# Patient Record
Sex: FEMALE | Race: BLACK OR AFRICAN AMERICAN | NOT HISPANIC OR LATINO | Employment: OTHER | URBAN - METROPOLITAN AREA
[De-identification: names, ages, dates, MRNs, and addresses within clinical notes are randomized per-mention and may not be internally consistent; named-entity substitution may affect disease eponyms.]

---

## 2025-01-04 ENCOUNTER — APPOINTMENT (OUTPATIENT)
Dept: RADIOLOGY | Facility: HOSPITAL | Age: 65
End: 2025-01-04
Payer: MEDICARE

## 2025-01-04 ENCOUNTER — HOSPITAL ENCOUNTER (INPATIENT)
Facility: HOSPITAL | Age: 65
End: 2025-01-04
Attending: STUDENT IN AN ORGANIZED HEALTH CARE EDUCATION/TRAINING PROGRAM | Admitting: STUDENT IN AN ORGANIZED HEALTH CARE EDUCATION/TRAINING PROGRAM
Payer: MEDICARE

## 2025-01-04 DIAGNOSIS — Z79.4 CONTROLLED TYPE 2 DIABETES MELLITUS WITH COMPLICATION, WITH LONG-TERM CURRENT USE OF INSULIN (MULTI): ICD-10-CM

## 2025-01-04 DIAGNOSIS — E11.8 CONTROLLED TYPE 2 DIABETES MELLITUS WITH COMPLICATION, WITH LONG-TERM CURRENT USE OF INSULIN (MULTI): ICD-10-CM

## 2025-01-04 DIAGNOSIS — B34.9 VIRAL SYNDROME: Primary | ICD-10-CM

## 2025-01-04 DIAGNOSIS — K92.1 MELENA: ICD-10-CM

## 2025-01-04 LAB
ABO GROUP (TYPE) IN BLOOD: NORMAL
ALBUMIN SERPL BCP-MCNC: 4 G/DL (ref 3.4–5)
ALP SERPL-CCNC: 99 U/L (ref 33–136)
ALT SERPL W P-5'-P-CCNC: 13 U/L (ref 7–45)
ANION GAP BLDV CALCULATED.4IONS-SCNC: 11 MMOL/L (ref 10–25)
ANION GAP SERPL CALC-SCNC: 16 MMOL/L (ref 10–20)
ANTIBODY SCREEN: NORMAL
APTT PPP: 31 SECONDS (ref 27–38)
AST SERPL W P-5'-P-CCNC: 11 U/L (ref 9–39)
BASE EXCESS BLDV CALC-SCNC: 5.8 MMOL/L (ref -2–3)
BASOPHILS # BLD AUTO: 0.02 X10*3/UL (ref 0–0.1)
BASOPHILS NFR BLD AUTO: 0.4 %
BILIRUB DIRECT SERPL-MCNC: 0.1 MG/DL (ref 0–0.3)
BILIRUB SERPL-MCNC: 0.4 MG/DL (ref 0–1.2)
BODY TEMPERATURE: 37 DEGREES CELSIUS
BUN SERPL-MCNC: 27 MG/DL (ref 6–23)
CA-I BLDV-SCNC: 1.06 MMOL/L (ref 1.1–1.33)
CALCIUM SERPL-MCNC: 8.7 MG/DL (ref 8.6–10.6)
CARDIAC TROPONIN I PNL SERPL HS: 6 NG/L (ref 0–34)
CHLORIDE BLDV-SCNC: 96 MMOL/L (ref 98–107)
CHLORIDE SERPL-SCNC: 96 MMOL/L (ref 98–107)
CO2 SERPL-SCNC: 27 MMOL/L (ref 21–32)
CREAT SERPL-MCNC: 1.01 MG/DL (ref 0.5–1.05)
EGFRCR SERPLBLD CKD-EPI 2021: 62 ML/MIN/1.73M*2
EOSINOPHIL # BLD AUTO: 0.03 X10*3/UL (ref 0–0.7)
EOSINOPHIL NFR BLD AUTO: 0.5 %
ERYTHROCYTE [DISTWIDTH] IN BLOOD BY AUTOMATED COUNT: 13 % (ref 11.5–14.5)
FLUAV RNA RESP QL NAA+PROBE: NOT DETECTED
FLUBV RNA RESP QL NAA+PROBE: NOT DETECTED
GLUCOSE BLD MANUAL STRIP-MCNC: 355 MG/DL (ref 74–99)
GLUCOSE BLDV-MCNC: 330 MG/DL (ref 74–99)
GLUCOSE SERPL-MCNC: 313 MG/DL (ref 74–99)
HCO3 BLDV-SCNC: 30.6 MMOL/L (ref 22–26)
HCT VFR BLD AUTO: 36.9 % (ref 36–46)
HCT VFR BLD EST: 40 % (ref 36–46)
HGB BLD-MCNC: 13 G/DL (ref 12–16)
HGB BLDV-MCNC: 13.3 G/DL (ref 12–16)
IMM GRANULOCYTES # BLD AUTO: 0.03 X10*3/UL (ref 0–0.7)
IMM GRANULOCYTES NFR BLD AUTO: 0.5 % (ref 0–0.9)
INR PPP: 1.1 (ref 0.9–1.1)
LACTATE BLDV-SCNC: 1.7 MMOL/L (ref 0.4–2)
LYMPHOCYTES # BLD AUTO: 1.35 X10*3/UL (ref 1.2–4.8)
LYMPHOCYTES NFR BLD AUTO: 24.7 %
MAGNESIUM SERPL-MCNC: 1.23 MG/DL (ref 1.6–2.4)
MCH RBC QN AUTO: 29.8 PG (ref 26–34)
MCHC RBC AUTO-ENTMCNC: 35.2 G/DL (ref 32–36)
MCV RBC AUTO: 85 FL (ref 80–100)
MONOCYTES # BLD AUTO: 0.75 X10*3/UL (ref 0.1–1)
MONOCYTES NFR BLD AUTO: 13.7 %
NEUTROPHILS # BLD AUTO: 3.28 X10*3/UL (ref 1.2–7.7)
NEUTROPHILS NFR BLD AUTO: 60.2 %
NRBC BLD-RTO: 0 /100 WBCS (ref 0–0)
OXYHGB MFR BLDV: 36.7 % (ref 45–75)
PCO2 BLDV: 44 MM HG (ref 41–51)
PH BLDV: 7.45 PH (ref 7.33–7.43)
PLATELET # BLD AUTO: 452 X10*3/UL (ref 150–450)
PO2 BLDV: 29 MM HG (ref 35–45)
POTASSIUM BLDV-SCNC: 2.7 MMOL/L (ref 3.5–5.3)
POTASSIUM SERPL-SCNC: 2.7 MMOL/L (ref 3.5–5.3)
PROT SERPL-MCNC: 7.5 G/DL (ref 6.4–8.2)
PROTHROMBIN TIME: 11.9 SECONDS (ref 9.8–12.8)
RBC # BLD AUTO: 4.36 X10*6/UL (ref 4–5.2)
RH FACTOR (ANTIGEN D): NORMAL
SAO2 % BLDV: 37 % (ref 45–75)
SARS-COV-2 RNA RESP QL NAA+PROBE: NOT DETECTED
SODIUM BLDV-SCNC: 135 MMOL/L (ref 136–145)
SODIUM SERPL-SCNC: 136 MMOL/L (ref 136–145)
WBC # BLD AUTO: 5.5 X10*3/UL (ref 4.4–11.3)

## 2025-01-04 PROCEDURE — 86900 BLOOD TYPING SEROLOGIC ABO: CPT

## 2025-01-04 PROCEDURE — 83735 ASSAY OF MAGNESIUM: CPT

## 2025-01-04 PROCEDURE — 71046 X-RAY EXAM CHEST 2 VIEWS: CPT | Performed by: STUDENT IN AN ORGANIZED HEALTH CARE EDUCATION/TRAINING PROGRAM

## 2025-01-04 PROCEDURE — 99285 EMERGENCY DEPT VISIT HI MDM: CPT | Performed by: STUDENT IN AN ORGANIZED HEALTH CARE EDUCATION/TRAINING PROGRAM

## 2025-01-04 PROCEDURE — 85610 PROTHROMBIN TIME: CPT

## 2025-01-04 PROCEDURE — 96367 TX/PROPH/DG ADDL SEQ IV INF: CPT

## 2025-01-04 PROCEDURE — 85730 THROMBOPLASTIN TIME PARTIAL: CPT

## 2025-01-04 PROCEDURE — 87636 SARSCOV2 & INF A&B AMP PRB: CPT

## 2025-01-04 PROCEDURE — 84484 ASSAY OF TROPONIN QUANT: CPT

## 2025-01-04 PROCEDURE — 71046 X-RAY EXAM CHEST 2 VIEWS: CPT

## 2025-01-04 PROCEDURE — 82947 ASSAY GLUCOSE BLOOD QUANT: CPT

## 2025-01-04 PROCEDURE — 2500000004 HC RX 250 GENERAL PHARMACY W/ HCPCS (ALT 636 FOR OP/ED)

## 2025-01-04 PROCEDURE — 2500000002 HC RX 250 W HCPCS SELF ADMINISTERED DRUGS (ALT 637 FOR MEDICARE OP, ALT 636 FOR OP/ED)

## 2025-01-04 PROCEDURE — 82810 BLOOD GASES O2 SAT ONLY: CPT

## 2025-01-04 PROCEDURE — 85025 COMPLETE CBC W/AUTO DIFF WBC: CPT

## 2025-01-04 PROCEDURE — 96366 THER/PROPH/DIAG IV INF ADDON: CPT

## 2025-01-04 PROCEDURE — 1210000001 HC SEMI-PRIVATE ROOM DAILY

## 2025-01-04 PROCEDURE — 80053 COMPREHEN METABOLIC PANEL: CPT

## 2025-01-04 PROCEDURE — 2550000001 HC RX 255 CONTRASTS: Performed by: STUDENT IN AN ORGANIZED HEALTH CARE EDUCATION/TRAINING PROGRAM

## 2025-01-04 PROCEDURE — 82248 BILIRUBIN DIRECT: CPT

## 2025-01-04 PROCEDURE — 36415 COLL VENOUS BLD VENIPUNCTURE: CPT

## 2025-01-04 PROCEDURE — 96365 THER/PROPH/DIAG IV INF INIT: CPT

## 2025-01-04 PROCEDURE — 74177 CT ABD & PELVIS W/CONTRAST: CPT

## 2025-01-04 PROCEDURE — 74177 CT ABD & PELVIS W/CONTRAST: CPT | Performed by: RADIOLOGY

## 2025-01-04 PROCEDURE — 99285 EMERGENCY DEPT VISIT HI MDM: CPT | Mod: 25 | Performed by: STUDENT IN AN ORGANIZED HEALTH CARE EDUCATION/TRAINING PROGRAM

## 2025-01-04 PROCEDURE — 82805 BLOOD GASES W/O2 SATURATION: CPT

## 2025-01-04 PROCEDURE — 2500000001 HC RX 250 WO HCPCS SELF ADMINISTERED DRUGS (ALT 637 FOR MEDICARE OP)

## 2025-01-04 PROCEDURE — 93010 ELECTROCARDIOGRAM REPORT: CPT | Performed by: STUDENT IN AN ORGANIZED HEALTH CARE EDUCATION/TRAINING PROGRAM

## 2025-01-04 RX ORDER — PANTOPRAZOLE SODIUM 40 MG/1
1 TABLET, DELAYED RELEASE ORAL 2 TIMES DAILY
Status: ON HOLD | COMMUNITY
Start: 2024-06-26 | End: 2025-01-09

## 2025-01-04 RX ORDER — AMLODIPINE BESYLATE 10 MG/1
10 TABLET ORAL DAILY
Status: ON HOLD | COMMUNITY
Start: 2024-06-26 | End: 2025-01-09

## 2025-01-04 RX ORDER — HYDROXYZINE HYDROCHLORIDE 10 MG/1
10 TABLET, FILM COATED ORAL EVERY 8 HOURS PRN
Status: DISCONTINUED | OUTPATIENT
Start: 2025-01-04 | End: 2025-01-09 | Stop reason: HOSPADM

## 2025-01-04 RX ORDER — ATORVASTATIN CALCIUM 40 MG/1
40 TABLET, FILM COATED ORAL EVERY EVENING
Status: ON HOLD | COMMUNITY
End: 2025-01-09

## 2025-01-04 RX ORDER — LISINOPRIL 10 MG/1
10 TABLET ORAL DAILY
Status: DISCONTINUED | OUTPATIENT
Start: 2025-01-05 | End: 2025-01-09 | Stop reason: HOSPADM

## 2025-01-04 RX ORDER — HYDROXYZINE HYDROCHLORIDE 10 MG/1
1 TABLET, FILM COATED ORAL
Status: ON HOLD | COMMUNITY
Start: 2024-09-05 | End: 2025-01-09

## 2025-01-04 RX ORDER — LISINOPRIL 10 MG/1
10 TABLET ORAL DAILY
Status: ON HOLD | COMMUNITY
End: 2025-01-09

## 2025-01-04 RX ORDER — MAGNESIUM SULFATE HEPTAHYDRATE 40 MG/ML
2 INJECTION, SOLUTION INTRAVENOUS ONCE
Status: COMPLETED | OUTPATIENT
Start: 2025-01-04 | End: 2025-01-04

## 2025-01-04 RX ORDER — ONDANSETRON 4 MG/1
4 TABLET, ORALLY DISINTEGRATING ORAL EVERY 8 HOURS PRN
Status: DISCONTINUED | OUTPATIENT
Start: 2025-01-04 | End: 2025-01-09 | Stop reason: HOSPADM

## 2025-01-04 RX ORDER — METOPROLOL TARTRATE 50 MG/1
100 TABLET ORAL 2 TIMES DAILY
Status: DISCONTINUED | OUTPATIENT
Start: 2025-01-04 | End: 2025-01-09 | Stop reason: HOSPADM

## 2025-01-04 RX ORDER — AMITRIPTYLINE HYDROCHLORIDE 10 MG/1
10 TABLET, FILM COATED ORAL NIGHTLY
Status: DISCONTINUED | OUTPATIENT
Start: 2025-01-04 | End: 2025-01-09 | Stop reason: HOSPADM

## 2025-01-04 RX ORDER — METOPROLOL TARTRATE 100 MG/1
100 TABLET ORAL 2 TIMES DAILY
Status: ON HOLD | COMMUNITY
End: 2025-01-09

## 2025-01-04 RX ORDER — POTASSIUM CHLORIDE 20 MEQ/1
20 TABLET, EXTENDED RELEASE ORAL DAILY
Status: DISCONTINUED | OUTPATIENT
Start: 2025-01-04 | End: 2025-01-04

## 2025-01-04 RX ORDER — ACETAMINOPHEN 325 MG/1
975 TABLET ORAL ONCE
Status: COMPLETED | OUTPATIENT
Start: 2025-01-04 | End: 2025-01-04

## 2025-01-04 RX ORDER — PANTOPRAZOLE SODIUM 40 MG/1
40 TABLET, DELAYED RELEASE ORAL
Status: DISCONTINUED | OUTPATIENT
Start: 2025-01-05 | End: 2025-01-09 | Stop reason: HOSPADM

## 2025-01-04 RX ORDER — TALC
6 POWDER (GRAM) TOPICAL NIGHTLY
Status: DISCONTINUED | OUTPATIENT
Start: 2025-01-04 | End: 2025-01-09 | Stop reason: HOSPADM

## 2025-01-04 RX ORDER — DEXTROSE 50 % IN WATER (D50W) INTRAVENOUS SYRINGE
12.5
Status: DISCONTINUED | OUTPATIENT
Start: 2025-01-04 | End: 2025-01-09 | Stop reason: HOSPADM

## 2025-01-04 RX ORDER — ASPIRIN 81 MG/1
81 TABLET ORAL ONCE
Status: COMPLETED | OUTPATIENT
Start: 2025-01-04 | End: 2025-01-05

## 2025-01-04 RX ORDER — MECLIZINE HCL 12.5 MG 12.5 MG/1
12.5 TABLET ORAL 3 TIMES DAILY PRN
COMMUNITY
Start: 2024-07-18 | End: 2025-01-09 | Stop reason: HOSPADM

## 2025-01-04 RX ORDER — ASPIRIN 81 MG/1
81 TABLET ORAL ONCE
Status: ON HOLD | COMMUNITY
Start: 2023-12-17 | End: 2025-01-09

## 2025-01-04 RX ORDER — DIGOXIN 125 MCG
125 TABLET ORAL DAILY
Status: DISCONTINUED | OUTPATIENT
Start: 2025-01-05 | End: 2025-01-09 | Stop reason: HOSPADM

## 2025-01-04 RX ORDER — DIGOXIN 125 MCG
125 TABLET ORAL DAILY
Status: ON HOLD | COMMUNITY
End: 2025-01-09

## 2025-01-04 RX ORDER — METOCLOPRAMIDE 10 MG/1
5 TABLET ORAL 3 TIMES DAILY
Status: DISCONTINUED | OUTPATIENT
Start: 2025-01-04 | End: 2025-01-09 | Stop reason: HOSPADM

## 2025-01-04 RX ORDER — AMITRIPTYLINE HYDROCHLORIDE 10 MG/1
1 TABLET, FILM COATED ORAL NIGHTLY
COMMUNITY
Start: 2023-07-27

## 2025-01-04 RX ORDER — METOCLOPRAMIDE 5 MG/1
5 TABLET ORAL 3 TIMES DAILY
Status: ON HOLD | COMMUNITY
End: 2025-01-09

## 2025-01-04 RX ORDER — POTASSIUM CHLORIDE 14.9 MG/ML
20 INJECTION INTRAVENOUS
Status: COMPLETED | OUTPATIENT
Start: 2025-01-04 | End: 2025-01-04

## 2025-01-04 RX ORDER — GABAPENTIN 300 MG/1
300 CAPSULE ORAL 3 TIMES DAILY
Status: ON HOLD | COMMUNITY
End: 2025-01-09

## 2025-01-04 RX ORDER — MECLIZINE HYDROCHLORIDE 25 MG/1
12.5 TABLET ORAL 3 TIMES DAILY PRN
Status: DISCONTINUED | OUTPATIENT
Start: 2025-01-04 | End: 2025-01-09 | Stop reason: HOSPADM

## 2025-01-04 RX ORDER — DEXTROSE 50 % IN WATER (D50W) INTRAVENOUS SYRINGE
25
Status: DISCONTINUED | OUTPATIENT
Start: 2025-01-04 | End: 2025-01-09 | Stop reason: HOSPADM

## 2025-01-04 RX ORDER — INSULIN GLARGINE 100 [IU]/ML
20 INJECTION, SOLUTION SUBCUTANEOUS NIGHTLY
Status: DISCONTINUED | OUTPATIENT
Start: 2025-01-04 | End: 2025-01-09 | Stop reason: HOSPADM

## 2025-01-04 RX ORDER — TALC
2 POWDER (GRAM) TOPICAL NIGHTLY
Status: ON HOLD | COMMUNITY
Start: 2024-07-18 | End: 2025-01-08 | Stop reason: WASHOUT

## 2025-01-04 RX ORDER — POTASSIUM CHLORIDE 20 MEQ/1
40 TABLET, EXTENDED RELEASE ORAL ONCE
Status: COMPLETED | OUTPATIENT
Start: 2025-01-04 | End: 2025-01-04

## 2025-01-04 RX ORDER — AMLODIPINE BESYLATE 10 MG/1
10 TABLET ORAL DAILY
Status: DISCONTINUED | OUTPATIENT
Start: 2025-01-05 | End: 2025-01-09 | Stop reason: HOSPADM

## 2025-01-04 RX ORDER — ATORVASTATIN CALCIUM 40 MG/1
40 TABLET, FILM COATED ORAL EVERY EVENING
Status: DISCONTINUED | OUTPATIENT
Start: 2025-01-04 | End: 2025-01-09 | Stop reason: HOSPADM

## 2025-01-04 RX ORDER — GABAPENTIN 300 MG/1
300 CAPSULE ORAL 3 TIMES DAILY
Status: DISCONTINUED | OUTPATIENT
Start: 2025-01-04 | End: 2025-01-09 | Stop reason: HOSPADM

## 2025-01-04 RX ORDER — INSULIN LISPRO 100 [IU]/ML
0-15 INJECTION, SOLUTION INTRAVENOUS; SUBCUTANEOUS
Status: DISCONTINUED | OUTPATIENT
Start: 2025-01-04 | End: 2025-01-09 | Stop reason: HOSPADM

## 2025-01-04 RX ADMIN — SODIUM CHLORIDE 1000 ML: 9 INJECTION, SOLUTION INTRAVENOUS at 17:08

## 2025-01-04 RX ADMIN — ONDANSETRON 4 MG: 4 TABLET, ORALLY DISINTEGRATING ORAL at 17:07

## 2025-01-04 RX ADMIN — POTASSIUM CHLORIDE 20 MEQ: 14.9 INJECTION, SOLUTION INTRAVENOUS at 19:02

## 2025-01-04 RX ADMIN — IOHEXOL 80 ML: 350 INJECTION, SOLUTION INTRAVENOUS at 20:35

## 2025-01-04 RX ADMIN — MAGNESIUM SULFATE HEPTAHYDRATE 2 G: 2 INJECTION, SOLUTION INTRAVENOUS at 20:20

## 2025-01-04 RX ADMIN — ACETAMINOPHEN 975 MG: 325 TABLET ORAL at 17:20

## 2025-01-04 RX ADMIN — POTASSIUM CHLORIDE 20 MEQ: 14.9 INJECTION, SOLUTION INTRAVENOUS at 17:07

## 2025-01-04 RX ADMIN — POTASSIUM CHLORIDE 40 MEQ: 1500 TABLET, EXTENDED RELEASE ORAL at 17:19

## 2025-01-04 ASSESSMENT — COLUMBIA-SUICIDE SEVERITY RATING SCALE - C-SSRS
1. IN THE PAST MONTH, HAVE YOU WISHED YOU WERE DEAD OR WISHED YOU COULD GO TO SLEEP AND NOT WAKE UP?: NO
6. HAVE YOU EVER DONE ANYTHING, STARTED TO DO ANYTHING, OR PREPARED TO DO ANYTHING TO END YOUR LIFE?: NO
2. HAVE YOU ACTUALLY HAD ANY THOUGHTS OF KILLING YOURSELF?: NO

## 2025-01-04 ASSESSMENT — PAIN DESCRIPTION - LOCATION: LOCATION: BACK

## 2025-01-04 ASSESSMENT — PAIN DESCRIPTION - PROGRESSION: CLINICAL_PROGRESSION: NOT CHANGED

## 2025-01-04 ASSESSMENT — PAIN - FUNCTIONAL ASSESSMENT: PAIN_FUNCTIONAL_ASSESSMENT: 0-10

## 2025-01-04 ASSESSMENT — PAIN DESCRIPTION - ONSET: ONSET: ONGOING

## 2025-01-04 ASSESSMENT — PAIN DESCRIPTION - FREQUENCY: FREQUENCY: CONSTANT/CONTINUOUS

## 2025-01-04 ASSESSMENT — PAIN SCALES - GENERAL: PAINLEVEL_OUTOF10: 8

## 2025-01-04 ASSESSMENT — PAIN DESCRIPTION - PAIN TYPE: TYPE: ACUTE PAIN

## 2025-01-04 NOTE — ED TRIAGE NOTES
Patient came to ED with c/o nausea, vomiting, diarrhea, generalized weakness, fatigue x 5 days. Pt reports black stool x 2 days. Patient reports high blood glucose at home, DM2.  in triage.

## 2025-01-04 NOTE — ED PROVIDER NOTES
Emergency Department Provider Note        History of Present Illness     History provided by: Patient  Limitations to History: None  External Records Reviewed with Brief Summary: Outpatient progress note from 8/20/23 which showed the below GI history  11/17/20 EGD hematemesis  - Normal oropharynx.  - Normal esophagus.  - Normal stomach.  - Normal examined duodenum.  - No specimens collected.    10/8/18 EGD  - LA Grade C reflux esophagitis.  - Non-bleeding gastric ulcers with no stigmata of bleeding. Biopsied for recurrent H. Pylori.  - Retained fluid in the 2nd portion of the duodenum  Path=Helicobacter pylori-induced chronic active gastritis with reactive epithelial changes    8/3/18 EGD  - Non-severe reflux esophagitis. Rule out Moore's  esophagus. Biopsied.  - Gastritis. Biopsied.  - Normal examined duodenum.  - The examination was otherwise normal.  Path-1. Antrum, biopsy (A) - Antral mucosa with chronic inactive gastritis and  rare Helicobacter organisms. - See comment. 2. Esophagogastric junction,  biopsy (B) - Inflamed cardiofundic mucosa. - No evidence of intestinal  metaplasia or dysplasia.    1/19/16 EGD  - Normal esophagus.  - Erosive gastropathy.  - Normal examined duodenum.  - No specimens collected.     HPI:  Sulma Kumari is a 64 y.o. female w/ PMHx of Anxiety and depression, Constipation, Diabetes type II, esophagitis, gastritis, H. pylori infection 2018), Hyperlipidemia, and Hypertension presenting for 2 weeks of congestion and cough with 2 days of vomiting and black watery stools.  Patient reports everyone in her family had a stomach flu recently.  She reports left flank pain, lightheadedness, and fatigue currently.  Reports shortness of breath on exertion but has been able to lay flat without issue and no shortness of breath at rest.  Denies fever, chills, chest pain, abdominal pain, dysuria, hematemesis, hemoptysis, hematochezia.  Denies being on blood thinners.  Denies fall or  syncope.    Physical Exam   Triage vitals:  T 36.3 °C (97.3 °F)  HR (!) 116  /83  RR 18  O2 99 % None (Room air)    General: Awake, alert, in no acute distress  Eyes: Gaze conjugate.  No scleral icterus or injection  HENT: Normo-cephalic, atraumatic. No stridor  CV: Tachycardic rate, regular rhythm.  Resp: Breathing non-labored, speaking in full sentences.  Clear to auscultation bilaterally  GI: Soft, non-distended, non-tender. No rebound or guarding.  : Perianal erythema and moisture on inspection.  STEPHANIE negative for blood.  MSK/Extremities: No gross bony deformities. Moving all extremities  Skin: Warm. Appropriate color  Neuro: Alert. Oriented. Face symmetric. Speech is fluent.  Gross strength and sensation intact in b/l UE and LEs  Psych: Appropriate mood and affect    Medical Decision Making & ED Course   Medical Decision Makin y.o. female w/ PMHx of Anxiety and depression, Constipation, Diabetes type II, esophagitis, gastritis, H. pylori infection 2018, Hyperlipidemia, and Hypertension presenting for 2 weeks of congestion and cough with 2 days of vomiting and black watery stools.  In the ED patient tachycardic to 116 but otherwise hemodynamically stable and in no distress. EKG showed sinus tachycardia. VBG on presentation with mild metabolic alkalosis pH 7.45/pCO2 44, low potassium K2.7-> replaced with 40 mg IV and 40 mg PO potassium, and elevated blood glucose 330.  Workup obtained including CBC which showed normal WBC and Hb with elevated platelets 452.  PTT/INR were normal.  BMP with elevated glc 313, low K 2.7, magnesium was low 1.23-> repleted with 2 g IV, hepatic function panel wnl, troponin negative, flu/COVID negative.  Chest x-ray negative, and CT abdomen pending.  Patient was placed on telemetry and given fluids, potassium, magnesium, Tylenol, Zofran.  ----  Differential diagnoses considered include but are not limited to:   URI symptoms: Viral URI, ACS, pneumonia;   GI sx: GI bleed,  viral gastroenteritis, appendicitis, gallstones     Social Determinants of Health which Significantly Impact Care: None identified     EKG Independent Interpretation: EKG interpreted by myself. Please see ED Course for full interpretation.    Independent Result Review and Interpretation: Relevant laboratory and radiographic results were reviewed and independently interpreted by myself.  As necessary, they are commented on in the ED Course.    Chronic conditions affecting the patient's care: As documented above in [facility]    The patient was discussed with the following consultants/services: None    Care Considerations: As documented above in [facility]    ED Course:  ED Course as of 01/08/25 0355   Sat Jan 04, 2025   1708 Attending summary:  65 y/o F with PMHx HTN, HLD, insulin-dep DM, PUD who is presenting for melena. She reports 2 day of melena, no bright red bleeding. +vomiting but no hematemesis. Also reporting LLQ pain. Has had lightheadedness and dyspnea on exertion, no chest pain. Has had cough/congestion for about 2 weeks. Initially subjective fevers, those resolved. Reports prior bleeding gastric ulcers, last scoped 2023. No anticoagulation. Vitals show tachycardia, otherwise unremarkable. Pt appears uncomfortable but nontoxic, AO x4, mild LLQ tenderness without peritonitis, no LE edema or tenderness.    High suspicion for GI bleed, likely upper from gastric ulcers. HDS, no active bleeding, no anticoagulation. She does have lower abd tenderness, diverticulitis possible but less likely with report of melena. VBG initially showed hypokalemia which fits with her GI losses and normal lactate and Hgb. Plan for broad labs, CXR, CT a/p and IV fluids. Will replace K. Plan for admission for serial hgb, hydration, electrolyte replacement and GI consultation.  [SS]   2032 EKG shows sinus tach with , normal axis, Qtc 479 but otherwise normal intervals, no signs of acute ischemia with ST segment or T wave [SS]      ED Course  User Index  [SS] Alia Kwon MD         Diagnoses as of 01/08/25 0355   Viral syndrome     Disposition   Patient was signed out to Rico Gaston at 7PM pending completion of their work-up.  Please see the next provider's transition of care note for the remainder of the patient's care.     Patient seen and discussed with ED attending physician.    Phylicia Reis M.D.  Family Medicine  PGY-2       Phylicia Reis MD  Resident  01/04/25 1904       Alia Kwon MD  01/08/25 0355

## 2025-01-05 VITALS
RESPIRATION RATE: 16 BRPM | DIASTOLIC BLOOD PRESSURE: 66 MMHG | WEIGHT: 174 LBS | BODY MASS INDEX: 28.99 KG/M2 | HEIGHT: 65 IN | SYSTOLIC BLOOD PRESSURE: 123 MMHG | OXYGEN SATURATION: 94 % | HEART RATE: 82 BPM | TEMPERATURE: 97.5 F

## 2025-01-05 LAB
ALBUMIN SERPL BCP-MCNC: 3.7 G/DL (ref 3.4–5)
ANION GAP SERPL CALC-SCNC: 14 MMOL/L (ref 10–20)
BUN SERPL-MCNC: 13 MG/DL (ref 6–23)
CALCIUM SERPL-MCNC: 8.9 MG/DL (ref 8.6–10.6)
CHLORIDE SERPL-SCNC: 104 MMOL/L (ref 98–107)
CO2 SERPL-SCNC: 25 MMOL/L (ref 21–32)
CREAT SERPL-MCNC: 0.57 MG/DL (ref 0.5–1.05)
EGFRCR SERPLBLD CKD-EPI 2021: >90 ML/MIN/1.73M*2
ERYTHROCYTE [DISTWIDTH] IN BLOOD BY AUTOMATED COUNT: 13.2 % (ref 11.5–14.5)
ERYTHROCYTE [DISTWIDTH] IN BLOOD BY AUTOMATED COUNT: 13.4 % (ref 11.5–14.5)
EST. AVERAGE GLUCOSE BLD GHB EST-MCNC: 338 MG/DL
GLUCOSE BLD MANUAL STRIP-MCNC: 106 MG/DL (ref 74–99)
GLUCOSE BLD MANUAL STRIP-MCNC: 139 MG/DL (ref 74–99)
GLUCOSE BLD MANUAL STRIP-MCNC: 193 MG/DL (ref 74–99)
GLUCOSE BLD MANUAL STRIP-MCNC: 221 MG/DL (ref 74–99)
GLUCOSE BLD MANUAL STRIP-MCNC: 249 MG/DL (ref 74–99)
GLUCOSE SERPL-MCNC: 117 MG/DL (ref 74–99)
HBA1C MFR BLD: 13.4 %
HCT VFR BLD AUTO: 26.8 % (ref 36–46)
HCT VFR BLD AUTO: 34.6 % (ref 36–46)
HGB BLD-MCNC: 12.2 G/DL (ref 12–16)
HGB BLD-MCNC: 9.2 G/DL (ref 12–16)
MAGNESIUM SERPL-MCNC: 1.89 MG/DL (ref 1.6–2.4)
MCH RBC QN AUTO: 29.3 PG (ref 26–34)
MCH RBC QN AUTO: 29.8 PG (ref 26–34)
MCHC RBC AUTO-ENTMCNC: 34.3 G/DL (ref 32–36)
MCHC RBC AUTO-ENTMCNC: 35.3 G/DL (ref 32–36)
MCV RBC AUTO: 84 FL (ref 80–100)
MCV RBC AUTO: 85 FL (ref 80–100)
NRBC BLD-RTO: 0 /100 WBCS (ref 0–0)
NRBC BLD-RTO: 0 /100 WBCS (ref 0–0)
PHOSPHATE SERPL-MCNC: 2.3 MG/DL (ref 2.5–4.9)
PLATELET # BLD AUTO: 328 X10*3/UL (ref 150–450)
PLATELET # BLD AUTO: 411 X10*3/UL (ref 150–450)
POTASSIUM SERPL-SCNC: 3.8 MMOL/L (ref 3.5–5.3)
RBC # BLD AUTO: 3.14 X10*6/UL (ref 4–5.2)
RBC # BLD AUTO: 4.1 X10*6/UL (ref 4–5.2)
SODIUM SERPL-SCNC: 139 MMOL/L (ref 136–145)
WBC # BLD AUTO: 3.9 X10*3/UL (ref 4.4–11.3)
WBC # BLD AUTO: 5.4 X10*3/UL (ref 4.4–11.3)

## 2025-01-05 PROCEDURE — 2500000005 HC RX 250 GENERAL PHARMACY W/O HCPCS

## 2025-01-05 PROCEDURE — 82947 ASSAY GLUCOSE BLOOD QUANT: CPT

## 2025-01-05 PROCEDURE — 83036 HEMOGLOBIN GLYCOSYLATED A1C: CPT

## 2025-01-05 PROCEDURE — 36415 COLL VENOUS BLD VENIPUNCTURE: CPT

## 2025-01-05 PROCEDURE — 2500000002 HC RX 250 W HCPCS SELF ADMINISTERED DRUGS (ALT 637 FOR MEDICARE OP, ALT 636 FOR OP/ED)

## 2025-01-05 PROCEDURE — 2500000001 HC RX 250 WO HCPCS SELF ADMINISTERED DRUGS (ALT 637 FOR MEDICARE OP)

## 2025-01-05 PROCEDURE — 36415 COLL VENOUS BLD VENIPUNCTURE: CPT | Performed by: STUDENT IN AN ORGANIZED HEALTH CARE EDUCATION/TRAINING PROGRAM

## 2025-01-05 PROCEDURE — 85027 COMPLETE CBC AUTOMATED: CPT | Performed by: STUDENT IN AN ORGANIZED HEALTH CARE EDUCATION/TRAINING PROGRAM

## 2025-01-05 PROCEDURE — 1100000001 HC PRIVATE ROOM DAILY

## 2025-01-05 PROCEDURE — 99233 SBSQ HOSP IP/OBS HIGH 50: CPT | Performed by: STUDENT IN AN ORGANIZED HEALTH CARE EDUCATION/TRAINING PROGRAM

## 2025-01-05 PROCEDURE — 2500000004 HC RX 250 GENERAL PHARMACY W/ HCPCS (ALT 636 FOR OP/ED): Performed by: STUDENT IN AN ORGANIZED HEALTH CARE EDUCATION/TRAINING PROGRAM

## 2025-01-05 PROCEDURE — 80069 RENAL FUNCTION PANEL: CPT | Performed by: STUDENT IN AN ORGANIZED HEALTH CARE EDUCATION/TRAINING PROGRAM

## 2025-01-05 PROCEDURE — 83735 ASSAY OF MAGNESIUM: CPT | Performed by: STUDENT IN AN ORGANIZED HEALTH CARE EDUCATION/TRAINING PROGRAM

## 2025-01-05 PROCEDURE — 99223 1ST HOSP IP/OBS HIGH 75: CPT | Performed by: STUDENT IN AN ORGANIZED HEALTH CARE EDUCATION/TRAINING PROGRAM

## 2025-01-05 RX ORDER — ACETAMINOPHEN 325 MG/1
650 TABLET ORAL 3 TIMES DAILY PRN
Status: DISCONTINUED | OUTPATIENT
Start: 2025-01-05 | End: 2025-01-09 | Stop reason: HOSPADM

## 2025-01-05 RX ORDER — PANTOPRAZOLE SODIUM 40 MG/10ML
40 INJECTION, POWDER, LYOPHILIZED, FOR SOLUTION INTRAVENOUS 2 TIMES DAILY
Status: DISCONTINUED | OUTPATIENT
Start: 2025-01-05 | End: 2025-01-06

## 2025-01-05 RX ADMIN — PANTOPRAZOLE SODIUM 40 MG: 40 TABLET, DELAYED RELEASE ORAL at 07:07

## 2025-01-05 RX ADMIN — METOPROLOL TARTRATE 100 MG: 50 TABLET, FILM COATED ORAL at 10:02

## 2025-01-05 RX ADMIN — METOCLOPRAMIDE 5 MG: 10 TABLET ORAL at 15:05

## 2025-01-05 RX ADMIN — METOCLOPRAMIDE 5 MG: 10 TABLET ORAL at 21:41

## 2025-01-05 RX ADMIN — INSULIN GLARGINE 20 UNITS: 100 INJECTION, SOLUTION SUBCUTANEOUS at 21:50

## 2025-01-05 RX ADMIN — ASPIRIN 81 MG: 81 TABLET, COATED ORAL at 01:03

## 2025-01-05 RX ADMIN — INSULIN LISPRO 3 UNITS: 100 INJECTION, SOLUTION INTRAVENOUS; SUBCUTANEOUS at 09:55

## 2025-01-05 RX ADMIN — DIGOXIN 125 MCG: 125 TABLET ORAL at 10:02

## 2025-01-05 RX ADMIN — Medication 6 MG: at 01:04

## 2025-01-05 RX ADMIN — METOPROLOL TARTRATE 100 MG: 50 TABLET, FILM COATED ORAL at 01:03

## 2025-01-05 RX ADMIN — GABAPENTIN 300 MG: 300 CAPSULE ORAL at 01:04

## 2025-01-05 RX ADMIN — ATORVASTATIN CALCIUM 40 MG: 40 TABLET, FILM COATED ORAL at 01:03

## 2025-01-05 RX ADMIN — GABAPENTIN 300 MG: 300 CAPSULE ORAL at 10:02

## 2025-01-05 RX ADMIN — METOCLOPRAMIDE 5 MG: 10 TABLET ORAL at 01:04

## 2025-01-05 RX ADMIN — AMLODIPINE BESYLATE 10 MG: 10 TABLET ORAL at 10:07

## 2025-01-05 RX ADMIN — AMITRIPTYLINE HYDROCHLORIDE 10 MG: 10 TABLET, FILM COATED ORAL at 21:50

## 2025-01-05 RX ADMIN — GABAPENTIN 300 MG: 300 CAPSULE ORAL at 15:05

## 2025-01-05 RX ADMIN — LISINOPRIL 10 MG: 10 TABLET ORAL at 10:02

## 2025-01-05 RX ADMIN — METOCLOPRAMIDE 5 MG: 10 TABLET ORAL at 10:02

## 2025-01-05 RX ADMIN — GABAPENTIN 300 MG: 300 CAPSULE ORAL at 21:41

## 2025-01-05 RX ADMIN — INSULIN GLARGINE 20 UNITS: 100 INJECTION, SOLUTION SUBCUTANEOUS at 01:05

## 2025-01-05 RX ADMIN — PANTOPRAZOLE SODIUM 40 MG: 40 INJECTION, POWDER, FOR SOLUTION INTRAVENOUS at 21:41

## 2025-01-05 RX ADMIN — ATORVASTATIN CALCIUM 40 MG: 40 TABLET, FILM COATED ORAL at 21:41

## 2025-01-05 ASSESSMENT — PAIN SCALES - GENERAL: PAINLEVEL_OUTOF10: 9

## 2025-01-05 ASSESSMENT — PAIN DESCRIPTION - LOCATION: LOCATION: BUTTOCKS

## 2025-01-05 ASSESSMENT — PAIN - FUNCTIONAL ASSESSMENT: PAIN_FUNCTIONAL_ASSESSMENT: 0-10

## 2025-01-05 NOTE — PROGRESS NOTES
Emergency Department Transition of Care Note       Signout   I received Sulma Kumari in signout from Dr. Reis.  Please see the ED Provider Note for all HPI, PE and MDM up to the time of signout at 1900.  This is in addition to the primary record.    In brief Sulma Kumari is an 64 y.o. female presenting for leg symptoms and continuous diarrhea with known history of Crohn's.  Patient found to have hypomagnesemia, hypokalemia.    At the time of signout we were awaiting:  Electrolyte repletion and CT abdomen/pelvis.    ED Course & Medical Decision Making   Medical Decision Making:  Under my care, potassium and magnesium were repleted.  CT abdomen/pelvis showed evidence of hyperdense intestinal contents versus possible bleed.  Radiology recommended repeat CTA after allowing passage of intestinal contents for true assessment of intestinal bleed.  Patient has remained hemodynamically stable.  Admitted patient to medicine for further treatment of electrolyte abnormalities and repeat imaging of the abdomen.    ED Course:  ED Course as of 01/04/25 2140   Sat Jan 04, 2025   1708 Attending summary:  65 y/o F with PMHx HTN, HLD, insulin-dep DM, PUD who is presenting for melena. She reports 2 day of melena, no bright red bleeding. +vomiting but no hematemesis. Also reporting LLQ pain. Has had lightheadedness and dyspnea on exertion, no chest pain. Has had cough/congestion for about 2 weeks. Initially subjective fevers, those resolved. Reports prior bleeding gastric ulcers, last scoped 2023. No anticoagulation. Vitals show tachycardia, otherwise unremarkable. Pt appears uncomfortable but nontoxic, AO x4, mild LLQ tenderness without peritonitis, no LE edema or tenderness.    High suspicion for GI bleed, likely upper from gastric ulcers. HDS, no active bleeding, no anticoagulation. She does have lower abd tenderness, diverticulitis possible but less likely with report of melena. VBG initially showed hypokalemia which fits with her  GI losses and normal lactate and Hgb. Plan for broad labs, CXR, CT a/p and IV fluids. Will replace K. Plan for admission for serial hgb, hydration, electrolyte replacement and GI consultation.  [SS]   2032 EKG shows sinus tach with , normal axis, Qtc 479 but otherwise normal intervals, no signs of acute ischemia with ST segment or T wave [SS]      ED Course User Index  [SS] Alia Kwon MD         Diagnoses as of 01/04/25 2140   Viral syndrome       Disposition   As a result of their workup, the patient will require admission to the hospital.  The patient was informed of her diagnosis.  The patient was given the opportunity to ask questions and I answered them. The patient agreed to be admitted to the hospital.    Procedures   Procedures    Patient seen and discussed with ED attending physician.    Rico Dc,   Emergency Medicine

## 2025-01-05 NOTE — CONSULTS
[facility]   [facility]  INITIAL CONSULT NOTE     Source of Information: The source of the history was patient    Consult requested by: Service: Medicine    Reason for Consult: Melena    Admission Chief Complaint: URI, melena, emesis      SUBJECTIVE     HPI: Sulma Kumari is a 64 y.o. female w/PMH of anxiety, depression, constipation, diabetes type II, hx of recurrent H. pylori infection 2018), Hyperlipidemia, and Hypertension who presents to [facility] on 01/04/24 for subacute URI symptoms as well as 2 day history of black stools. Initial work up notable for Hb of 13.0. Repeat labs on 01/05 after patient got multiple fluid boluses demonstrated Hb of 9.2. During her ED stay, she had couple of bouts of witnessed black stool as per primary. Given history of upper GI bleeding as well as H Pylori infection, GI is consulted for evaluation of possible ongoing upper GI bleeding.     Off note, patient presented to [facility] on 08/2023 with generalized abdominal pain as well as one day history of coffee ground emesis and black stool. Labs had demonstrated stable Hb of 14.3. CT had shown mildly edematous hyperemic mesentery. GI was consulted and patient underwent EGD on 08/22/23 which revealed small hiatal hernia as well as mild non-erosive gastritis which was again biopsied. Biopsy results again showed presence of H Pylori.     At bedside, patient reports she is not aware if she every truly got treated for H Pylori in the past. Reports having episodic reflux symptoms for which she takes PPI BID regularly.    ROS: Complete review of systems obtained, negative unless otherwise indicated above.     Allergies   Allergen Reactions    Dapagliflozin Itching    Metformin GI Upset    Naproxen Hives and Unknown     Makes eye red    Diazepam Itching    Insulin Glargine Itching     Hives    Sulfa (Sulfonamide Antibiotics) Rash and Unknown     No past medical history on file.  No past surgical  history on file.  No family history on file.  Social History     Social History Narrative    Not on file     [unfilled]    Medications:  Scheduled medications  amitriptyline, 10 mg, oral, Nightly  amLODIPine, 10 mg, oral, Daily  atorvastatin, 40 mg, oral, q PM  digoxin, 125 mcg, oral, Daily  gabapentin, 300 mg, oral, TID  insulin glargine, 20 Units, subcutaneous, Nightly  insulin lispro, 0-15 Units, subcutaneous, TID AC  lisinopril, 10 mg, oral, Daily  melatonin, 6 mg, oral, Nightly  metoclopramide, 5 mg, oral, TID  metoprolol tartrate, 100 mg, oral, BID  [Held by provider] pantoprazole, 40 mg, oral, BID AC  pantoprazole, 40 mg, intravenous, BID      Continuous medications     PRN medications  PRN medications: acetaminophen, dextrose, dextrose, glucagon, glucagon, hydrOXYzine HCL, meclizine, ondansetron ODT       EXAM     Vital signs:  [unfilled]    Physical Exam  Alert and oriented  CTAB  RRR  Abd soft NT ND  Skin warm and dry        DATA                                                                            Labs     Lab Results   Component Value Date    WBC 3.9 (L) 01/05/2025    WBC 5.5 01/04/2025    HGB 9.2 (L) 01/05/2025    HGB 13.0 01/04/2025    MCV 85 01/05/2025    MCV 85 01/04/2025     01/05/2025     (H) 01/04/2025       Lab Results   Component Value Date    GLUCOSE 313 (H) 01/04/2025    CALCIUM 8.7 01/04/2025     01/04/2025    K 2.7 (LL) 01/04/2025    CO2 27 01/04/2025    CL 96 (L) 01/04/2025    BUN 27 (H) 01/04/2025    CREATININE 1.01 01/04/2025       Lab Results   Component Value Date    ALT 13 01/04/2025    AST 11 01/04/2025    ALKPHOS 99 01/04/2025    BILITOT 0.4 01/04/2025                                                                                  Imaging           === 08/23/17 ===    US RENAL COMPLETE    - Impression -  Negative exam.    This report has been produced using speech recognition.      Original Interpreting Physician:   DIONTE DAWSON M.D.  Original Transcribed  by/Date: PSCB   Aug 23 2017  1:10P  Original Electronically Signed by/Date: DIONTE DAWSON M.D. Aug 23 2017  1:10P    Addendum Interpreting Physician:  Addendum Transcribed by/Date: NO ADDENDUM  Addendum Electronically Signed by/Date:  === 01/04/25 ===    CT ABDOMEN PELVIS W IV CONTRAST    - Impression -  1. No evidence of acute abdominal or pelvic abnormality.  2. Dense contrast throughout the colon and scattered throughout the  small bowel would limit evaluation for active GI bleed on CTA.  Therefore, if clinical concern for active GI bleed persists, nuclear  medicine tagged RBC scan may be considered.  3. Other chronic findings as above.    I personally reviewed the images/study and I agree with the findings  as stated by Dr. Usman Campos. This study was interpreted at  [place], [place].    MACRO:  None    Signed by: Olimpia Lam 1/4/2025 10:15 PM  Dictation workstation:   [id number]                                                                           GI Procedures   EGD 08/22/23  Impression:            - Normal oropharynx.                        - Small hiatal hernia.                        - Erythematous mucosa in the gastric body and                        antrum. Biopsied.                        - Normal examined duodenum.   Addendum A. Given the background of chronic gastritis, a Helicobacter pylori immunostain is performed on block A and is positive for Helicobacter pylori organisms.       Path:   11/17/20 EGD hematemesis  - Normal oropharynx.   - Normal esophagus.   - Normal stomach.   - Normal examined duodenum.   - No specimens collected.    10/8/18 EGD   - LA Grade C reflux esophagitis.   - Non-bleeding gastric ulcers with no stigmata of bleeding. Biopsied for recurrent H. Pylori.   - Retained fluid in the 2nd portion of the duodenum  Path=Helicobacter pylori-induced chronic active gastritis with reactive epithelial changes    8/3/18 EGD  - Non-severe  reflux esophagitis. Rule out Moore's   esophagus. Biopsied.   - Gastritis. Biopsied.   - Normal examined duodenum.   - The examination was otherwise normal.  Path-1. Antrum, biopsy (A) - Antral mucosa with chronic inactive gastritis and   rare Helicobacter organisms. - See comment. 2. Esophagogastric junction,   biopsy (B) - Inflamed cardiofundic mucosa. - No evidence of intestinal   metaplasia or dysplasia.    1/19/16 EGD  - Normal esophagus.   - Erosive gastropathy.   - Normal examined duodenum.   - No specimens collected.       ASSESSMENT / PLAN                  Assessment and Recommendations:   Sulma Kumari is a 64 y.o. female w/PMH of anxiety, depression, constipation, diabetes type II, hx of recurrent H. pylori infection 2018), Hyperlipidemia, and Hypertension who presents to [facility] on 01/04/24 for subacute URI symptoms as well as 2 day history of black stools. Initial work up notable for Hb of 13.0. Repeat labs on 01/05 after patient got multiple fluid boluses demonstrated Hb of 9.2. During her ED stay, she had couple of bouts of witnessed black stool as per primary. Given history of upper GI bleeding as well as H Pylori infection, GI is consulted for evaluation of possible ongoing upper GI bleeding.     #Upper GI bleeding  #Melena  #Acute anemia?  :: Concern for possible untreated H Pylori infection given history - this could increase risk of PUD which can then increased risk of UGIB and cause melena. Other ddx include esophagitis from longstanding GERD despite being on PPI BID.    Recommendations:  - NPO at MN   - EGD with gastric biopsied on 01/06/25  - Pantoprazole BID  - Ensure adequate PIV access x 2, HB goal >7  - Ensure patient has GI follow up in fellows clinic after discharge    Staffed and seen with Dr Davey.     EMIL per primary team.   ------------------------------------------------------------------------  Shlomo Smith MD  Gastroenterology Fellow    After 5PM and on Weekends, please page  on-call fellow.    Final recommendations pending attending attestation.

## 2025-01-05 NOTE — PROGRESS NOTES
Assessment/Plan   Sulma Kumari is a 64 y.o. female w/ PMHx of Anxiety and depression, Constipation, Diabetes type II, esophagitis, gastritis, H. pylori infection 2018), Hyperlipidemia, and Hypertension presenting for 2 weeks of congestion and cough followed by recurrent n/v and then diarrhea with black stools. Concern for UGIB. Hb was stable on admit, possibly hemoconcerntrated from fluid losses. Subsequent hb is dropped to 9.2. blood consent obtained. She is having some dizziness and continued black stools. Denies iron or bismuth sulfate use. Denies NSAIDs. Started on ppi bid IV. GI consutled..    Melena c/f UGIB  Acute blood loss anemia  H/o esophogitis  H/o PUD, h. pylori  - in setting of h/o esophogitis, gastric ulcer, h. Pylori  - in setting of recurrent nausea and vomitting, possible annalise keri in setting of n/v.   - ct angio limited, did not show active bleed however.   - HDS, does feel dizzy.   - continue cld for now.   - trend h/h, repeat this afternoon. Active type and screen. Consented for blood. PPI BID IV. Appreciate gi consults    Viral prodrome: URI, gastroenteritis.   - with cough, congestion, n/v and diarrhea. Pt also reports possible suspcious food ingeston prior  - overal improving. Continue symptomatic management  - monitoring stools as above  - send stool pcr, c. Dif for diarrhea    Uncontrolled DMII  - A1c 13  - on glrgine 28 units daily and ozempic and ssi at home  - started glargine 20 units daily, ssi    HTN  - close monitoring due to concern for bleed  - continue amlidpine, lisinpril, metop     Hypokalemia  - repeat rfp this afternoon. Check magnesium    Afib  - on digoxin and metop       Scheduled outpatient appointments in system:   No future appointments.  ---------------------------------------------------------------------------------------------------  Subjective   Pt reporting improved dizziness but still present. She reports continued black stools, still loose. No cp, sob,  "palpitations. Has had sinus congestion and cough that is improving. She had several days of n/v that are improving that was associated with the diarrhrea. Stool currently is black.      ---------------------------------------------------------------------------------------------------  Objective   Last Recorded Vitals  Blood pressure 118/60, pulse 78, temperature 36.4 °C (97.5 °F), temperature source Temporal, resp. rate 18, height 1.651 m (5' 5\"), weight 78.9 kg (174 lb), SpO2 100%.  Intake/Output last 3 Shifts:  I/O last 3 completed shifts:  In: 1000 (12.7 mL/kg) [IV Piggyback:1000]  Out: - (0 mL/kg)   Weight: 78.9 kg     Physical Exam  Vitals and nursing note reviewed.   Constitutional:       General: She is not in acute distress.     Appearance: Normal appearance. She is not ill-appearing or toxic-appearing.   HENT:      Head: Normocephalic and atraumatic.      Mouth/Throat:      Mouth: Mucous membranes are moist.   Eyes:      General: No scleral icterus.     Extraocular Movements: Extraocular movements intact.      Conjunctiva/sclera: Conjunctivae normal.   Cardiovascular:      Rate and Rhythm: Normal rate and regular rhythm.      Heart sounds: S1 normal and S2 normal. No murmur heard.  Pulmonary:      Effort: Pulmonary effort is normal. No respiratory distress.      Breath sounds: No wheezing, rhonchi or rales.   Abdominal:      General: Bowel sounds are normal. There is no distension.      Palpations: Abdomen is soft.      Tenderness: There is no abdominal tenderness. There is no guarding or rebound.   Musculoskeletal:         General: No swelling or deformity.      Cervical back: Neck supple.   Skin:     General: Skin is warm and dry.      Findings: No rash.   Neurological:      General: No focal deficit present.      Mental Status: She is alert. Mental status is at baseline.   Psychiatric:         Mood and Affect: Mood normal.         Relevant Results  Lab Results   Component Value Date    WBC 3.9 (L) " 01/05/2025    HGB 9.2 (L) 01/05/2025    HCT 26.8 (L) 01/05/2025    MCV 85 01/05/2025     01/05/2025      Lab Results   Component Value Date    GLUCOSE 313 (H) 01/04/2025    CALCIUM 8.7 01/04/2025     01/04/2025    K 2.7 (LL) 01/04/2025    CO2 27 01/04/2025    CL 96 (L) 01/04/2025    BUN 27 (H) 01/04/2025    CREATININE 1.01 01/04/2025     Scheduled medications  amitriptyline, 10 mg, oral, Nightly  amLODIPine, 10 mg, oral, Daily  atorvastatin, 40 mg, oral, q PM  digoxin, 125 mcg, oral, Daily  gabapentin, 300 mg, oral, TID  insulin glargine, 20 Units, subcutaneous, Nightly  insulin lispro, 0-15 Units, subcutaneous, TID AC  lisinopril, 10 mg, oral, Daily  melatonin, 6 mg, oral, Nightly  metoclopramide, 5 mg, oral, TID  metoprolol tartrate, 100 mg, oral, BID  [Held by provider] pantoprazole, 40 mg, oral, BID AC  pantoprazole, 40 mg, intravenous, BID      Continuous medications     PRN medications  PRN medications: acetaminophen, dextrose, dextrose, glucagon, glucagon, hydrOXYzine HCL, meclizine, ondansetron ODT    Jb Courtney MD

## 2025-01-05 NOTE — H&P
History Of Present Illness  Sulma Kumari is a 64 y.o. female w/ PMHx of Anxiety and depression, Constipation, Diabetes type II, esophagitis, gastritis, H. pylori infection 2018), Hyperlipidemia, and Hypertension presenting for 2 weeks of congestion and cough with 2 days of vomiting and black watery stools. No bright red per blood per rectum, pt reported LLQ abdominal pain, non radiating, nothing make the pain better or worse.reported gastric ulcer bleeding. Pt denies anticoagulation use, denies recent NSAIDs use. Denies fever or chills, denies chest pain, in the ED pt vitally stable. Labs showed  hypokalemia normal lactate and Hgb. Chest xray with no evidence of bleeding. CTA abdomen limit evaluation for active GI bleed. Radiology recommend  nuclear medicine tagged RBC scan may be considered. Will admit to medicine for further evaluation and management.     GI history   11/17/20 EGD hematemesis  - Normal oropharynx.  - Normal esophagus.  - Normal stomach.  - Normal examined duodenum.  - No specimens collected.    10/8/18 EGD  - LA Grade C reflux esophagitis.  - Non-bleeding gastric ulcers with no stigmata of bleeding. Biopsied for recurrent H. Pylori.  - Retained fluid in the 2nd portion of the duodenum  Path=Helicobacter pylori-induced chronic active gastritis with reactive epithelial changes    8/3/18 EGD  - Non-severe reflux esophagitis. Rule out Moore's  esophagus. Biopsied.  - Gastritis. Biopsied.  - Normal examined duodenum.  - The examination was otherwise normal.  Path-1. Antrum, biopsy (A) - Antral mucosa with chronic inactive gastritis and  rare Helicobacter organisms. - See comment. 2. Esophagogastric junction,  biopsy (B) - Inflamed cardiofundic mucosa. - No evidence of intestinal  metaplasia or dysplasia.    1/19/16 EGD  - Normal esophagus.  - Erosive gastropathy.  - Normal examined duodenum.  - No specimens collected.        Past Medical History  She has no past medical history on file.    Surgical  History  She has no past surgical history on file.     Social History  She has no history on file for tobacco use, alcohol use, and drug use.    Family History  No family history on file.     Allergies  Dapagliflozin, Metformin, Naproxen, Diazepam, Insulin glargine, and Sulfa (sulfonamide antibiotics)    Review of Systems     Physical Exam   Constitutional: Patient does not appear to be in any acute distress, AOx4  HEENT: NCAT, normal external inspection of ears and nose. Oropharynx normal.  Cardio: RRR, S1/S2, no murmurs, rubs, or gallops, radial pulses +2, no edema of extremities  Pulm: CTAB, no respiratory distress.  GI: +BS, soft, non-tender, nondistended, no guarding or rebound, no masses noted,STEPHANIE negative for blood.   MSK: No joint swelling, normal movements of all extremities. Normal ROM, 5/5 strength  Skin: No lesions, contusions, or erythema.  Extremities: no BLE swelling   Psych: Appropriate mood and behavior    Last Recorded Vitals  /80   Pulse 94   Temp 36.9 °C (98.4 °F) (Temporal)   Resp 19   Wt 78.9 kg (174 lb)   SpO2 98%     Relevant Results  Results for orders placed or performed during the hospital encounter of 01/04/25 (from the past 24 hours)   POCT GLUCOSE   Result Value Ref Range    POCT Glucose 355 (H) 74 - 99 mg/dL   Blood Gas Venous Full Panel Unsolicited   Result Value Ref Range    POCT pH, Venous 7.45 (H) 7.33 - 7.43 pH    POCT pCO2, Venous 44 41 - 51 mm Hg    POCT pO2, Venous 29 (L) 35 - 45 mm Hg    POCT SO2, Venous 37 (L) 45 - 75 %    POCT Oxy Hemoglobin, Venous 36.7 (L) 45.0 - 75.0 %    POCT Hematocrit Calculated, Venous 40.0 36.0 - 46.0 %    POCT Sodium, Venous 135 (L) 136 - 145 mmol/L    POCT Potassium, Venous 2.7 (LL) 3.5 - 5.3 mmol/L    POCT Chloride, Venous 96 (L) 98 - 107 mmol/L    POCT Ionized Calicum, Venous 1.06 (L) 1.10 - 1.33 mmol/L    POCT Glucose, Venous 330 (H) 74 - 99 mg/dL    POCT Lactate, Venous 1.7 0.4 - 2.0 mmol/L    POCT Base Excess, Venous 5.8 (H) -2.0 -  3.0 mmol/L    POCT HCO3 Calculated, Venous 30.6 (H) 22.0 - 26.0 mmol/L    POCT Hemoglobin, Venous 13.3 12.0 - 16.0 g/dL    POCT Anion Gap, Venous 11.0 10.0 - 25.0 mmol/L    Patient Temperature 37.0 degrees Celsius   CBC and Auto Differential   Result Value Ref Range    WBC 5.5 4.4 - 11.3 x10*3/uL    nRBC 0.0 0.0 - 0.0 /100 WBCs    RBC 4.36 4.00 - 5.20 x10*6/uL    Hemoglobin 13.0 12.0 - 16.0 g/dL    Hematocrit 36.9 36.0 - 46.0 %    MCV 85 80 - 100 fL    MCH 29.8 26.0 - 34.0 pg    MCHC 35.2 32.0 - 36.0 g/dL    RDW 13.0 11.5 - 14.5 %    Platelets 452 (H) 150 - 450 x10*3/uL    Neutrophils % 60.2 40.0 - 80.0 %    Immature Granulocytes %, Automated 0.5 0.0 - 0.9 %    Lymphocytes % 24.7 13.0 - 44.0 %    Monocytes % 13.7 2.0 - 10.0 %    Eosinophils % 0.5 0.0 - 6.0 %    Basophils % 0.4 0.0 - 2.0 %    Neutrophils Absolute 3.28 1.20 - 7.70 x10*3/uL    Immature Granulocytes Absolute, Automated 0.03 0.00 - 0.70 x10*3/uL    Lymphocytes Absolute 1.35 1.20 - 4.80 x10*3/uL    Monocytes Absolute 0.75 0.10 - 1.00 x10*3/uL    Eosinophils Absolute 0.03 0.00 - 0.70 x10*3/uL    Basophils Absolute 0.02 0.00 - 0.10 x10*3/uL   Basic metabolic panel   Result Value Ref Range    Glucose 313 (H) 74 - 99 mg/dL    Sodium 136 136 - 145 mmol/L    Potassium 2.7 (LL) 3.5 - 5.3 mmol/L    Chloride 96 (L) 98 - 107 mmol/L    Bicarbonate 27 21 - 32 mmol/L    Anion Gap 16 10 - 20 mmol/L    Urea Nitrogen 27 (H) 6 - 23 mg/dL    Creatinine 1.01 0.50 - 1.05 mg/dL    eGFR 62 >60 mL/min/1.73m*2    Calcium 8.7 8.6 - 10.6 mg/dL   Magnesium   Result Value Ref Range    Magnesium 1.23 (L) 1.60 - 2.40 mg/dL   Hepatic function panel   Result Value Ref Range    Albumin 4.0 3.4 - 5.0 g/dL    Bilirubin, Total 0.4 0.0 - 1.2 mg/dL    Bilirubin, Direct 0.1 0.0 - 0.3 mg/dL    Alkaline Phosphatase 99 33 - 136 U/L    ALT 13 7 - 45 U/L    AST 11 9 - 39 U/L    Total Protein 7.5 6.4 - 8.2 g/dL   Protime-INR   Result Value Ref Range    Protime 11.9 9.8 - 12.8 seconds    INR 1.1 0.9  - 1.1   APTT   Result Value Ref Range    aPTT 31 27 - 38 seconds   Type And Screen   Result Value Ref Range    ABO TYPE O     Rh TYPE POS     ANTIBODY SCREEN NEG    Troponin I, High Sensitivity   Result Value Ref Range    Troponin I, High Sensitivity (CMC) 6 0 - 34 ng/L   Influenza A, and B PCR   Result Value Ref Range    Flu A Result Not Detected Not Detected    Flu B Result Not Detected Not Detected   Sars-CoV-2 PCR   Result Value Ref Range    Coronavirus 2019, PCR Not Detected Not Detected           Assessment/Plan   63 y/o F with PMHx HTN, HLD, insulin-dep DM, PUD who is presenting for 2 days melena. No active bleeding pt HDS.     #Upper GI bleeding   #Melena   #hypokalemia   -hgb is stable   -abdomen CT with no acute finding radiology recommend tagged rbcs  -consult GI in the am   -consider tagged rbc study to look for bleeding   -replete electrolytes   -npo midnight     #Type II DM  -pt on ozempic, glargine 28 unit, sliding scale   -will start on 20 unit of glargine  -sliding scale  -hypoglycemia protocol     #HTN  -continue home medication    F:PRN  E:PRN  N:NPO midnight   A:PIV  Code status: Full code  DVT ppx: hold for bleeding       Assessment & Plan  Viral syndrome             Frida Lott MD

## 2025-01-05 NOTE — HOSPITAL COURSE
Sulma Kumari is a 64 y.o. female w/ PMHx of Anxiety and depression, Constipation, Diabetes type II, esophagitis, gastritis, H. pylori infection 2018), Hyperlipidemia, and Hypertension presenting for 2 weeks of congestion and cough followed by recurrent n/v and then diarrhea with black stools. Concern for UGIB. Hb was stable on admit, possibly hemoconcerntrated from fluid losses. Subsequent hb is dropped to 9.2. blood consent obtained. She is having some dizziness and continued black stools. Denies iron or bismuth sulfate use. Denies NSAIDs. Started on ppi bid IV. GI consutled.

## 2025-01-06 ENCOUNTER — ANESTHESIA (OUTPATIENT)
Dept: GASTROENTEROLOGY | Facility: HOSPITAL | Age: 65
End: 2025-01-06
Payer: MEDICARE

## 2025-01-06 ENCOUNTER — APPOINTMENT (OUTPATIENT)
Dept: GASTROENTEROLOGY | Facility: HOSPITAL | Age: 65
End: 2025-01-06
Payer: MEDICARE

## 2025-01-06 ENCOUNTER — ANESTHESIA EVENT (OUTPATIENT)
Dept: GASTROENTEROLOGY | Facility: HOSPITAL | Age: 65
End: 2025-01-06
Payer: MEDICARE

## 2025-01-06 LAB
ALBUMIN SERPL BCP-MCNC: 3.6 G/DL (ref 3.4–5)
ANION GAP SERPL CALC-SCNC: 10 MMOL/L (ref 10–20)
BUN SERPL-MCNC: 8 MG/DL (ref 6–23)
CALCIUM SERPL-MCNC: 8.8 MG/DL (ref 8.6–10.6)
CHLORIDE SERPL-SCNC: 107 MMOL/L (ref 98–107)
CO2 SERPL-SCNC: 27 MMOL/L (ref 21–32)
CREAT SERPL-MCNC: 0.57 MG/DL (ref 0.5–1.05)
EGFRCR SERPLBLD CKD-EPI 2021: >90 ML/MIN/1.73M*2
ERYTHROCYTE [DISTWIDTH] IN BLOOD BY AUTOMATED COUNT: 13.3 % (ref 11.5–14.5)
GLUCOSE BLD MANUAL STRIP-MCNC: 101 MG/DL (ref 74–99)
GLUCOSE BLD MANUAL STRIP-MCNC: 184 MG/DL (ref 74–99)
GLUCOSE BLD MANUAL STRIP-MCNC: 209 MG/DL (ref 74–99)
GLUCOSE SERPL-MCNC: 104 MG/DL (ref 74–99)
HCT VFR BLD AUTO: 35.6 % (ref 36–46)
HGB BLD-MCNC: 12.2 G/DL (ref 12–16)
MCH RBC QN AUTO: 29.8 PG (ref 26–34)
MCHC RBC AUTO-ENTMCNC: 34.3 G/DL (ref 32–36)
MCV RBC AUTO: 87 FL (ref 80–100)
NRBC BLD-RTO: 0 /100 WBCS (ref 0–0)
PHOSPHATE SERPL-MCNC: 3.2 MG/DL (ref 2.5–4.9)
PLATELET # BLD AUTO: 429 X10*3/UL (ref 150–450)
POTASSIUM SERPL-SCNC: 3.3 MMOL/L (ref 3.5–5.3)
RBC # BLD AUTO: 4.09 X10*6/UL (ref 4–5.2)
SODIUM SERPL-SCNC: 141 MMOL/L (ref 136–145)
WBC # BLD AUTO: 4.5 X10*3/UL (ref 4.4–11.3)

## 2025-01-06 PROCEDURE — 0DB98ZX EXCISION OF DUODENUM, VIA NATURAL OR ARTIFICIAL OPENING ENDOSCOPIC, DIAGNOSTIC: ICD-10-PCS | Performed by: STUDENT IN AN ORGANIZED HEALTH CARE EDUCATION/TRAINING PROGRAM

## 2025-01-06 PROCEDURE — 99232 SBSQ HOSP IP/OBS MODERATE 35: CPT | Performed by: STUDENT IN AN ORGANIZED HEALTH CARE EDUCATION/TRAINING PROGRAM

## 2025-01-06 PROCEDURE — 36415 COLL VENOUS BLD VENIPUNCTURE: CPT | Performed by: STUDENT IN AN ORGANIZED HEALTH CARE EDUCATION/TRAINING PROGRAM

## 2025-01-06 PROCEDURE — A43239 PR EDG TRANSORAL BIOPSY SINGLE/MULTIPLE

## 2025-01-06 PROCEDURE — 2500000002 HC RX 250 W HCPCS SELF ADMINISTERED DRUGS (ALT 637 FOR MEDICARE OP, ALT 636 FOR OP/ED): Performed by: STUDENT IN AN ORGANIZED HEALTH CARE EDUCATION/TRAINING PROGRAM

## 2025-01-06 PROCEDURE — 88305 TISSUE EXAM BY PATHOLOGIST: CPT | Performed by: PATHOLOGY

## 2025-01-06 PROCEDURE — 0DB78ZX EXCISION OF STOMACH, PYLORUS, VIA NATURAL OR ARTIFICIAL OPENING ENDOSCOPIC, DIAGNOSTIC: ICD-10-PCS | Performed by: STUDENT IN AN ORGANIZED HEALTH CARE EDUCATION/TRAINING PROGRAM

## 2025-01-06 PROCEDURE — 3700000002 HC GENERAL ANESTHESIA TIME - EACH INCREMENTAL 1 MINUTE

## 2025-01-06 PROCEDURE — 7100000010 HC PHASE TWO TIME - EACH INCREMENTAL 1 MINUTE

## 2025-01-06 PROCEDURE — A43239 PR EDG TRANSORAL BIOPSY SINGLE/MULTIPLE: Performed by: STUDENT IN AN ORGANIZED HEALTH CARE EDUCATION/TRAINING PROGRAM

## 2025-01-06 PROCEDURE — 82947 ASSAY GLUCOSE BLOOD QUANT: CPT

## 2025-01-06 PROCEDURE — 43239 EGD BIOPSY SINGLE/MULTIPLE: CPT | Performed by: STUDENT IN AN ORGANIZED HEALTH CARE EDUCATION/TRAINING PROGRAM

## 2025-01-06 PROCEDURE — 2500000004 HC RX 250 GENERAL PHARMACY W/ HCPCS (ALT 636 FOR OP/ED)

## 2025-01-06 PROCEDURE — 80069 RENAL FUNCTION PANEL: CPT | Performed by: STUDENT IN AN ORGANIZED HEALTH CARE EDUCATION/TRAINING PROGRAM

## 2025-01-06 PROCEDURE — 3700000001 HC GENERAL ANESTHESIA TIME - INITIAL BASE CHARGE

## 2025-01-06 PROCEDURE — 2500000005 HC RX 250 GENERAL PHARMACY W/O HCPCS

## 2025-01-06 PROCEDURE — 88305 TISSUE EXAM BY PATHOLOGIST: CPT | Mod: TC,SUR | Performed by: STUDENT IN AN ORGANIZED HEALTH CARE EDUCATION/TRAINING PROGRAM

## 2025-01-06 PROCEDURE — 7100000009 HC PHASE TWO TIME - INITIAL BASE CHARGE

## 2025-01-06 PROCEDURE — 2500000001 HC RX 250 WO HCPCS SELF ADMINISTERED DRUGS (ALT 637 FOR MEDICARE OP)

## 2025-01-06 PROCEDURE — 2500000004 HC RX 250 GENERAL PHARMACY W/ HCPCS (ALT 636 FOR OP/ED): Performed by: STUDENT IN AN ORGANIZED HEALTH CARE EDUCATION/TRAINING PROGRAM

## 2025-01-06 PROCEDURE — 1100000001 HC PRIVATE ROOM DAILY

## 2025-01-06 PROCEDURE — 2500000002 HC RX 250 W HCPCS SELF ADMINISTERED DRUGS (ALT 637 FOR MEDICARE OP, ALT 636 FOR OP/ED)

## 2025-01-06 PROCEDURE — 87900 PHENOTYPE INFECT AGENT DRUG: CPT | Performed by: STUDENT IN AN ORGANIZED HEALTH CARE EDUCATION/TRAINING PROGRAM

## 2025-01-06 PROCEDURE — 85027 COMPLETE CBC AUTOMATED: CPT | Performed by: STUDENT IN AN ORGANIZED HEALTH CARE EDUCATION/TRAINING PROGRAM

## 2025-01-06 RX ORDER — LIDOCAINE HCL/PF 100 MG/5ML
SYRINGE (ML) INTRAVENOUS AS NEEDED
Status: DISCONTINUED | OUTPATIENT
Start: 2025-01-06 | End: 2025-01-06

## 2025-01-06 RX ORDER — POTASSIUM CHLORIDE 20 MEQ/1
40 TABLET, EXTENDED RELEASE ORAL ONCE
Status: COMPLETED | OUTPATIENT
Start: 2025-01-06 | End: 2025-01-06

## 2025-01-06 RX ORDER — ONDANSETRON HYDROCHLORIDE 2 MG/ML
4 INJECTION, SOLUTION INTRAVENOUS ONCE AS NEEDED
OUTPATIENT
Start: 2025-01-06

## 2025-01-06 RX ORDER — PROPOFOL 10 MG/ML
INJECTION, EMULSION INTRAVENOUS AS NEEDED
Status: DISCONTINUED | OUTPATIENT
Start: 2025-01-06 | End: 2025-01-06

## 2025-01-06 RX ADMIN — AMITRIPTYLINE HYDROCHLORIDE 10 MG: 10 TABLET, FILM COATED ORAL at 20:49

## 2025-01-06 RX ADMIN — GABAPENTIN 300 MG: 300 CAPSULE ORAL at 15:47

## 2025-01-06 RX ADMIN — LISINOPRIL 10 MG: 10 TABLET ORAL at 08:35

## 2025-01-06 RX ADMIN — ATORVASTATIN CALCIUM 40 MG: 40 TABLET, FILM COATED ORAL at 20:52

## 2025-01-06 RX ADMIN — GABAPENTIN 300 MG: 300 CAPSULE ORAL at 20:54

## 2025-01-06 RX ADMIN — LIDOCAINE HYDROCHLORIDE 50 MG: 20 INJECTION INTRAVENOUS at 09:54

## 2025-01-06 RX ADMIN — METOCLOPRAMIDE 5 MG: 10 TABLET ORAL at 15:48

## 2025-01-06 RX ADMIN — INSULIN GLARGINE 20 UNITS: 100 INJECTION, SOLUTION SUBCUTANEOUS at 20:56

## 2025-01-06 RX ADMIN — Medication 6 MG: at 20:56

## 2025-01-06 RX ADMIN — DIGOXIN 125 MCG: 125 TABLET ORAL at 08:35

## 2025-01-06 RX ADMIN — METOPROLOL TARTRATE 100 MG: 50 TABLET, FILM COATED ORAL at 08:36

## 2025-01-06 RX ADMIN — GABAPENTIN 300 MG: 300 CAPSULE ORAL at 08:36

## 2025-01-06 RX ADMIN — METOCLOPRAMIDE 5 MG: 10 TABLET ORAL at 20:56

## 2025-01-06 RX ADMIN — AMLODIPINE BESYLATE 10 MG: 10 TABLET ORAL at 08:35

## 2025-01-06 RX ADMIN — POTASSIUM CHLORIDE 40 MEQ: 1500 TABLET, EXTENDED RELEASE ORAL at 11:25

## 2025-01-06 RX ADMIN — PROPOFOL 50 MG: 10 INJECTION, EMULSION INTRAVENOUS at 09:55

## 2025-01-06 RX ADMIN — PROPOFOL 100 MCG/KG/MIN: 10 INJECTION, EMULSION INTRAVENOUS at 09:56

## 2025-01-06 RX ADMIN — INSULIN LISPRO 6 UNITS: 100 INJECTION, SOLUTION INTRAVENOUS; SUBCUTANEOUS at 18:06

## 2025-01-06 RX ADMIN — METOPROLOL TARTRATE 100 MG: 50 TABLET, FILM COATED ORAL at 20:52

## 2025-01-06 RX ADMIN — PANTOPRAZOLE SODIUM 40 MG: 40 INJECTION, POWDER, FOR SOLUTION INTRAVENOUS at 08:35

## 2025-01-06 RX ADMIN — METOCLOPRAMIDE 5 MG: 10 TABLET ORAL at 08:35

## 2025-01-06 SDOH — ECONOMIC STABILITY: HOUSING INSECURITY: IN THE PAST 12 MONTHS, HOW MANY TIMES HAVE YOU MOVED WHERE YOU WERE LIVING?: 0

## 2025-01-06 SDOH — ECONOMIC STABILITY: HOUSING INSECURITY: AT ANY TIME IN THE PAST 12 MONTHS, WERE YOU HOMELESS OR LIVING IN A SHELTER (INCLUDING NOW)?: PATIENT DECLINED

## 2025-01-06 SDOH — SOCIAL STABILITY: SOCIAL INSECURITY
WITHIN THE LAST YEAR, HAVE YOU BEEN HUMILIATED OR EMOTIONALLY ABUSED IN OTHER WAYS BY YOUR PARTNER OR EX-PARTNER?: PATIENT DECLINED

## 2025-01-06 SDOH — SOCIAL STABILITY: SOCIAL INSECURITY: WITHIN THE LAST YEAR, HAVE YOU BEEN AFRAID OF YOUR PARTNER OR EX-PARTNER?: PATIENT DECLINED

## 2025-01-06 SDOH — SOCIAL STABILITY: SOCIAL INSECURITY
WITHIN THE LAST YEAR, HAVE YOU BEEN RAPED OR FORCED TO HAVE ANY KIND OF SEXUAL ACTIVITY BY YOUR PARTNER OR EX-PARTNER?: PATIENT DECLINED

## 2025-01-06 SDOH — HEALTH STABILITY: MENTAL HEALTH: CURRENT SMOKER: 0

## 2025-01-06 SDOH — ECONOMIC STABILITY: FOOD INSECURITY: WITHIN THE PAST 12 MONTHS, THE FOOD YOU BOUGHT JUST DIDN'T LAST AND YOU DIDN'T HAVE MONEY TO GET MORE.: PATIENT DECLINED

## 2025-01-06 SDOH — ECONOMIC STABILITY: FOOD INSECURITY: HOW HARD IS IT FOR YOU TO PAY FOR THE VERY BASICS LIKE FOOD, HOUSING, MEDICAL CARE, AND HEATING?: PATIENT DECLINED

## 2025-01-06 SDOH — ECONOMIC STABILITY: FOOD INSECURITY
WITHIN THE PAST 12 MONTHS, YOU WORRIED THAT YOUR FOOD WOULD RUN OUT BEFORE YOU GOT THE MONEY TO BUY MORE.: PATIENT DECLINED

## 2025-01-06 SDOH — ECONOMIC STABILITY: INCOME INSECURITY
IN THE PAST 12 MONTHS HAS THE ELECTRIC, GAS, OIL, OR WATER COMPANY THREATENED TO SHUT OFF SERVICES IN YOUR HOME?: PATIENT DECLINED

## 2025-01-06 SDOH — HEALTH STABILITY: MENTAL HEALTH: HOW OFTEN DO YOU HAVE A DRINK CONTAINING ALCOHOL?: PATIENT DECLINED

## 2025-01-06 SDOH — ECONOMIC STABILITY: TRANSPORTATION INSECURITY
IN THE PAST 12 MONTHS, HAS LACK OF TRANSPORTATION KEPT YOU FROM MEDICAL APPOINTMENTS OR FROM GETTING MEDICATIONS?: PATIENT DECLINED

## 2025-01-06 SDOH — ECONOMIC STABILITY: HOUSING INSECURITY: IN THE LAST 12 MONTHS, WAS THERE A TIME WHEN YOU WERE NOT ABLE TO PAY THE MORTGAGE OR RENT ON TIME?: PATIENT DECLINED

## 2025-01-06 SDOH — HEALTH STABILITY: MENTAL HEALTH: HOW OFTEN DO YOU HAVE SIX OR MORE DRINKS ON ONE OCCASION?: PATIENT DECLINED

## 2025-01-06 SDOH — SOCIAL STABILITY: SOCIAL INSECURITY
WITHIN THE LAST YEAR, HAVE YOU BEEN KICKED, HIT, SLAPPED, OR OTHERWISE PHYSICALLY HURT BY YOUR PARTNER OR EX-PARTNER?: PATIENT DECLINED

## 2025-01-06 SDOH — HEALTH STABILITY: MENTAL HEALTH: HOW MANY DRINKS CONTAINING ALCOHOL DO YOU HAVE ON A TYPICAL DAY WHEN YOU ARE DRINKING?: PATIENT DECLINED

## 2025-01-06 ASSESSMENT — PAIN SCALES - GENERAL
PAINLEVEL_OUTOF10: 0 - NO PAIN
PAIN_LEVEL: 0
PAINLEVEL_OUTOF10: 0 - NO PAIN
PAINLEVEL_OUTOF10: 0 - NO PAIN

## 2025-01-06 ASSESSMENT — ACTIVITIES OF DAILY LIVING (ADL)
LACK_OF_TRANSPORTATION: NO
LACK_OF_TRANSPORTATION: PATIENT DECLINED

## 2025-01-06 ASSESSMENT — COGNITIVE AND FUNCTIONAL STATUS - GENERAL
DAILY ACTIVITIY SCORE: 24
MOBILITY SCORE: 24

## 2025-01-06 ASSESSMENT — PAIN SCALES - WONG BAKER: WONGBAKER_NUMERICALRESPONSE: NO HURT

## 2025-01-06 ASSESSMENT — PAIN - FUNCTIONAL ASSESSMENT
PAIN_FUNCTIONAL_ASSESSMENT: 0-10

## 2025-01-06 ASSESSMENT — LIFESTYLE VARIABLES
SKIP TO QUESTIONS 9-10: 0
AUDIT-C TOTAL SCORE: -1

## 2025-01-06 NOTE — PROGRESS NOTES
Assessment/Plan   Sulma Kumari is a 64 y.o. female w/ PMHx of Anxiety and depression, Constipation, Diabetes type II, esophagitis, gastritis, H. pylori infection 2018), Hyperlipidemia, and Hypertension presenting for 2 weeks of congestion and cough followed by recurrent n/v and then diarrhea with black stools. Concern for UGIB. Hb was stable on admit, possibly hemoconcerntrated from fluid losses. Subsequent hb is dropped to 9.2. blood consent obtained. She is having some dizziness and continued black stools. Denies iron or bismuth sulfate use. Denies NSAIDs. Started on ppi bid IV. GI consutled..    Melena c/f UGIB  Acute blood loss anemia  H/o esophogitis  H/o PUD, h. pylori  - in setting of h/o esophogitis, gastric ulcer, h. Pylori  - in setting of recurrent nausea and vomitting, possible annalise keri in setting of n/v.   - ct angio limited, did not show active bleed however.   - HDS, does feel dizzy. H/h has remained stable, repeat showed probably false low value obtained.  - continue cld for now.   - trend h/h, repeat this afternoon. Active type and screen. Consented for blood. PPI BID IV. Appreciate gi consult recs.   - egd showed grade a esophogitis, biopsies taken.   - ok for daily ppi, will adjust. Monitor h/h overnight and bowel movements.     Dizziness  - check orthostatics    Viral prodrome: URI, gastroenteritis.   - with cough, congestion, n/v and diarrhea. Pt also reports possible suspcious food ingeston prior  - overal improving. Continue symptomatic management  - monitoring stools as above  - send stool pcr, c. Dif for diarrhea. Will dc c. Dif at this time no specimen given. Will continue to monitor for diarrhea if present will collect stool pcr.       Uncontrolled DMII  - A1c 13  - on glrgine 28 units daily and ozempic and ssi at home  - started glargine 20 units daily, ssi  - glc low in setting of being on cld then npo. Will monitor for adjustments as pt advances diet.     Scar on liver  - seen on  "ct  - pt after anesthesia reporting a bowel movement with some LLQ discmfort, she complained that she felt somethng was wrong with her liver. Pt was educated and counseled.  - repeat lft torrow. Prior lft stable. Albumin/inr ok, synthetic fucntion normal. Low concern for liver disease.        HTN  - close monitoring due to concern for bleed  - continue amlidpine, lisinpril, metop     Hypokalemia  - repeat rfp this afternoon. Check magnesium    Afib  - on digoxin and metop       Scheduled outpatient appointments in system:   No future appointments.  ---------------------------------------------------------------------------------------------------  Subjective   This morning pt reported continued lightheadedness (mild) and black stools. After egd pt crying and concerned about liver disease. She reported having a bm and then some llq disocmfort and thought it was her liver, she was concerned over the report when explained and had dififuclt understanding but after counselling she was able to calm down. Reports continued loose stools and black bowel movements, no gross blood. No luts, no flank pain.      ---------------------------------------------------------------------------------------------------  Objective   Last Recorded Vitals  Blood pressure 127/60, pulse 78, temperature 36.4 °C (97.6 °F), temperature source Temporal, resp. rate 16, height 1.651 m (5' 5\"), weight 78.9 kg (174 lb), SpO2 97%.  Intake/Output last 3 Shifts:  I/O last 3 completed shifts:  In: 1000 (12.7 mL/kg) [IV Piggyback:1000]  Out: - (0 mL/kg)   Weight: 78.9 kg     Physical Exam  Vitals and nursing note reviewed.   Constitutional:       General: She is not in acute distress.     Appearance: Normal appearance. She is not ill-appearing or toxic-appearing.   HENT:      Head: Normocephalic and atraumatic.      Mouth/Throat:      Mouth: Mucous membranes are moist.   Eyes:      General: No scleral icterus.     Extraocular Movements: Extraocular " movements intact.      Conjunctiva/sclera: Conjunctivae normal.   Cardiovascular:      Rate and Rhythm: Normal rate and regular rhythm.      Heart sounds: S1 normal and S2 normal. No murmur heard.  Pulmonary:      Effort: Pulmonary effort is normal. No respiratory distress.      Breath sounds: No wheezing, rhonchi or rales.   Abdominal:      General: Bowel sounds are normal. There is no distension.      Palpations: Abdomen is soft.      Tenderness: There is no abdominal tenderness. There is no guarding or rebound.      Comments: Non-tender, nonacute abdomen, no hepatomegaly noted   Musculoskeletal:         General: No swelling or deformity.      Cervical back: Neck supple.   Skin:     General: Skin is warm and dry.      Findings: No rash.   Neurological:      General: No focal deficit present.      Mental Status: She is alert. Mental status is at baseline.   Psychiatric:         Mood and Affect: Mood normal.         Relevant Results  Lab Results   Component Value Date    WBC 4.5 01/06/2025    HGB 12.2 01/06/2025    HCT 35.6 (L) 01/06/2025    MCV 87 01/06/2025     01/06/2025      Lab Results   Component Value Date    GLUCOSE 104 (H) 01/06/2025    CALCIUM 8.8 01/06/2025     01/06/2025    K 3.3 (L) 01/06/2025    CO2 27 01/06/2025     01/06/2025    BUN 8 01/06/2025    CREATININE 0.57 01/06/2025     Scheduled medications  amitriptyline, 10 mg, oral, Nightly  amLODIPine, 10 mg, oral, Daily  atorvastatin, 40 mg, oral, q PM  digoxin, 125 mcg, oral, Daily  gabapentin, 300 mg, oral, TID  insulin glargine, 20 Units, subcutaneous, Nightly  insulin lispro, 0-15 Units, subcutaneous, TID AC  lisinopril, 10 mg, oral, Daily  melatonin, 6 mg, oral, Nightly  metoclopramide, 5 mg, oral, TID  metoprolol tartrate, 100 mg, oral, BID  [Held by provider] pantoprazole, 40 mg, oral, BID AC  pantoprazole, 40 mg, intravenous, BID      Continuous medications     PRN medications  PRN medications: acetaminophen, dextrose,  dextrose, glucagon, glucagon, hydrOXYzine HCL, meclizine, ondansetron ODT    Jb Courtney MD

## 2025-01-06 NOTE — PROGRESS NOTES
"[facility]  [facility]  CONSULT FOLLOW-UP     Reason For Consult  Melena     SUBJECTIVE     No overnight events. Pt states she had an episode of dark stool yesterday early evening.    EXAM     Last Recorded Vitals  Blood pressure 137/80, pulse 84, temperature 36.7 °C (98.1 °F), resp. rate 18, height 1.651 m (5' 5\"), weight 78.9 kg (174 lb), SpO2 99%.    No intake or output data in the 24 hours ending 01/06/25 1845    Physical Exam   General: well-developed, well-nourished, no acute distress, AAOx3  HEENT: EOM intact  CV: regular rate and rhythm  Pulm: normal respiratory effort, clear to auscultation bilaterally Abd: soft, nontender, nondistended, no masses, normoactive bowel sounds  Extremities: warm, no LE edema  Neuro: moves all extremities equally, CN II - XII grossly intact  Psych: normal mood and affect  Skin: warm, dry, intact     OBJECTIVE                                                                              Medications             Current Facility-Administered Medications:     acetaminophen (Tylenol) tablet 650 mg, 650 mg, oral, TID PRN, Frida Lott MD    amitriptyline (Elavil) tablet 10 mg, 10 mg, oral, Nightly, Frida Lott MD, 10 mg at 01/05/25 2150    amLODIPine (Norvasc) tablet 10 mg, 10 mg, oral, Daily, Frida Lott MD, 10 mg at 01/06/25 0835    atorvastatin (Lipitor) tablet 40 mg, 40 mg, oral, q PM, Frida Lott MD, 40 mg at 01/05/25 2141    dextrose 50 % injection 12.5 g, 12.5 g, intravenous, q15 min PRN, Frida Lott MD    dextrose 50 % injection 25 g, 25 g, intravenous, q15 min PRN, Frida Lott MD    digoxin (Lanoxin) tablet 125 mcg, 125 mcg, oral, Daily, Frida Lott MD, 125 mcg at 01/06/25 0835    gabapentin (Neurontin) capsule 300 mg, 300 mg, oral, TID, Frida Lott MD, 300 mg at 01/06/25 1547    glucagon (Glucagen) injection 1 mg, 1 mg, intramuscular, q15 min PRN, " Frida Lott MD    glucagon (Glucagen) injection 1 mg, 1 mg, intramuscular, q15 min PRN, Frida Lott MD    hydrOXYzine HCL (Atarax) tablet 10 mg, 10 mg, oral, q8h PRN, Frida Lott MD    insulin glargine (Lantus) injection 20 Units, 20 Units, subcutaneous, Nightly, Frida Lott MD, 20 Units at 01/05/25 2150    insulin lispro injection 0-15 Units, 0-15 Units, subcutaneous, TID AC, Frida Lott MD, 6 Units at 01/06/25 1806    lisinopril tablet 10 mg, 10 mg, oral, Daily, Frida Lott MD, 10 mg at 01/06/25 0835    meclizine (Antivert) tablet 12.5 mg, 12.5 mg, oral, TID PRN, Frida Lott MD    melatonin tablet 6 mg, 6 mg, oral, Nightly, Frida Lott MD, 6 mg at 01/05/25 0104    metoclopramide (Reglan) tablet 5 mg, 5 mg, oral, TID, Frida Lott MD, 5 mg at 01/06/25 1548    metoprolol tartrate (Lopressor) tablet 100 mg, 100 mg, oral, BID, Frida Lott MD, 100 mg at 01/06/25 0836    ondansetron ODT (Zofran-ODT) disintegrating tablet 4 mg, 4 mg, oral, q8h PRN, Frida Lott MD, 4 mg at 01/04/25 1707    [Held by provider] pantoprazole (ProtoNix) EC tablet 40 mg, 40 mg, oral, BID AC, Frida Lott MD, 40 mg at 01/05/25 0707    pantoprazole (ProtoNix) injection 40 mg, 40 mg, intravenous, BID, Jb Courtney MD, 40 mg at 01/06/25 0835                                                                            Labs     Labs:  CBC RFP   Lab Results   Component Value Date    WBC 4.5 01/06/2025    HGB 12.2 01/06/2025    HCT 35.6 (L) 01/06/2025    MCV 87 01/06/2025     01/06/2025    NEUTROABS 3.28 01/04/2025    Lab Results   Component Value Date     01/06/2025    K 3.3 (L) 01/06/2025     01/06/2025    CO2 27 01/06/2025    BUN 8 01/06/2025    CREATININE 0.57 01/06/2025     Lab Results   Component Value Date    MG 1.89 01/05/2025    PHOS 3.2 01/06/2025    CALCIUM 8.8 01/06/2025    ALBUMIN 3.6 01/06/2025      "    Hepatic Function ABG/VBG   Lab Results   Component Value Date    ALT 13 01/04/2025    AST 11 01/04/2025    ALKPHOS 99 01/04/2025     Lab Results   Component Value Date    BILITOT 0.4 01/04/2025    BILIDIR 0.1 01/04/2025     Lab Results   Component Value Date    PROTIME 11.9 01/04/2025    APTT 31 01/04/2025    INR 1.1 01/04/2025    No results found for: \"NONUHFIRE\", \"LACTATE\"                                                                              Imaging     === 01/04/25 ===     CT ABDOMEN PELVIS W IV CONTRAST     - Impression -  1. No evidence of acute abdominal or pelvic abnormality.  2. Dense contrast throughout the colon and scattered throughout the  small bowel would limit evaluation for active GI bleed on CTA.  Therefore, if clinical concern for active GI bleed persists, nuclear  medicine tagged RBC scan may be considered.  3. Other chronic findings as above.                                                                         GI Procedures   EGD 1/6/25:  Impression  The upper third of the esophagus and middle third of the esophagus appeared normal.  Grade A esophagitis in the lower third of the esophagus  Mild erythematous, mosaic mucosa in the body of the stomach and antrum; performed cold forceps biopsy to rule out H. pylori  The duodenal bulb, 1st part of the duodenum and 2nd part of the duodenum appeared normal.  Mild abnormal mucosa in the esophagus  Polyp in the lesser curve of the stomach   Findings  The upper third of the esophagus and middle third of the esophagus appeared normal.  Grade A esophagitis with mucosal breaks measuring less than 5 mm not continuous between folds, covering less than 75% of the circumference in the lower third of the esophagus  Mild, patchy erythematous and mosaic mucosa in the body of the stomach and antrum; performed cold forceps biopsy to rule out H. pylori  The duodenal bulb, 1st part of the duodenum and 2nd part of the duodenum appeared normal.  Mild, patchy " abnormal mucosa in the esophagus. Glycogenic acanthosis  One benign-appearing polyp measuring smaller than 5 mm in the lesser curve of the stomach    EGD 08/22/23  Impression:            - Normal oropharynx.                        - Small hiatal hernia.                        - Erythematous mucosa in the gastric body and                        antrum. Biopsied.                        - Normal examined duodenum.     Path:   Addendum A. Given the background of chronic gastritis, a Helicobacter pylori immunostain is performed on block A and is positive for Helicobacter pylori organisms.       11/17/20 EGD hematemesis  - Normal oropharynx.   - Normal esophagus.   - Normal stomach.   - Normal examined duodenum.   - No specimens collected.    10/8/18 EGD   - LA Grade C reflux esophagitis.   - Non-bleeding gastric ulcers with no stigmata of bleeding. Biopsied for recurrent H. Pylori.   - Retained fluid in the 2nd portion of the duodenum  Path=Helicobacter pylori-induced chronic active gastritis with reactive epithelial changes    8/3/18 EGD  - Non-severe reflux esophagitis. Rule out Moore's   esophagus. Biopsied.   - Gastritis. Biopsied.   - Normal examined duodenum.   - The examination was otherwise normal.  Path-1. Antrum, biopsy (A) - Antral mucosa with chronic inactive gastritis and   rare Helicobacter organisms. - See comment. 2. Esophagogastric junction,   biopsy (B) - Inflamed cardiofundic mucosa. - No evidence of intestinal   metaplasia or dysplasia.    1/19/16 EGD  - Normal esophagus.   - Erosive gastropathy.   - Normal examined duodenum.   - No specimens collected.     ASSESSMENT / PLAN     ASSESSMENT/PLAN:  Sulma Kumari is a 64 y.o. female w/PMH of anxiety, depression, constipation, diabetes type II, hx of recurrent H. pylori infection 2018), Hyperlipidemia, and Hypertension who presents to [facility] on 01/04/24 for subacute URI symptoms as well as 2 day history of black stools. Initial work up notable for Hb of  13.0. Repeat labs on 01/05 after patient got multiple fluid boluses demonstrated Hb of 9.2. During her ED stay, she had couple of bouts of witnessed black stool as per primary. Given history of upper GI bleeding as well as H Pylori infection, GI is consulted for evaluation of possible ongoing upper GI bleeding.     Hgb this morning increased form 9>12 (pt's baseline) without transfusions. EGD 1/6/25 demonstrating Grade A esophagitis, biopsies taken to r/o hpylori given prior infections without testing for eradication.      #Upper GI bleeding  #Melena  #Acute anemia?  :: Concern for possible untreated H Pylori infection given history - this could increase risk of PUD which can then increased risk of UGIB and cause melena. Other ddx include esophagitis from longstanding GERD despite being on PPI BID.     Recommendations:  - Continue with Pantoprazole BID  - Ensure patient has GI follow up in fellows clinic after discharge    Patient was seen and discussed with Dr. Rodriguez .    Gastroenterology will sign off.  During weekday hours of 7am-5pm please do not hesitate to contact me on Get Me Listed Chat or page [phone], if there are any further questions between the weekday hours of 7am-5pm.   After hours, on weekends, and on holidays, please [place] [phone]. Thank you.

## 2025-01-06 NOTE — CARE PLAN
The clinical goals for the shift include pt will have no more, or less than 1 stool this shift    Over the shift, the patient   Problem: Bowel Elimination  Goal: LTG - I will have regular and routine bowel evacuation  Outcome: Progressing  Goal: STG - I maintain skin integrity  Outcome: Progressing  Goal: STG - I will be accident free/continent  Outcome: Progressing  Goal: STG - I will be continent of stool  Outcome: Progressing  Goal: STG - I will verbalize knowledge about relationship between diet, fluid intake, activity and medication on constipation  Outcome: Progressing     Problem: Bladder/Voiding  Goal: STG - I demonstrate no accidents  Outcome: Progressing  Goal: STG - I will remain continent  Outcome: Progressing   not make progress toward the following goals.       Problem: Pain - Adult  Goal: Verbalizes/displays adequate comfort level or baseline comfort level  Outcome: Progressing     Problem: Safety - Adult  Goal: Free from fall injury  Outcome: Progressing     Problem: Discharge Planning  Goal: Discharge to home or other facility with appropriate resources  Outcome: Progressing     Problem: Chronic Conditions and Co-morbidities  Goal: Patient's chronic conditions and co-morbidity symptoms are monitored and maintained or improved  Outcome: Progressing

## 2025-01-06 NOTE — ANESTHESIA PREPROCEDURE EVALUATION
Patient: Sulma Kumari    Procedure Information       Date/Time: 01/06/25 0930    Scheduled providers: Adislon Cantu MD    Procedure: EGD    Location: [facility]          Vitals:    01/06/25 0940   BP: 113/75   Pulse: 71   Resp: 16   Temp: 36.4 °C (97.6 °F)   SpO2: 98%       Past Surgical History:   Procedure Laterality Date    ANKLE SURGERY Left     1 screw remains in place    BACK SURGERY Bilateral     4 rods, 3 surgeries    CHOLECYSTECTOMY       Past Medical History:   Diagnosis Date    Anxiety     Diabetes mellitus (Multi)     Hypertension     Sleep apnea        Current Facility-Administered Medications:     acetaminophen (Tylenol) tablet 650 mg, 650 mg, oral, TID PRN, Frida Lott MD    amitriptyline (Elavil) tablet 10 mg, 10 mg, oral, Nightly, Frida Lott MD, 10 mg at 01/05/25 2150    amLODIPine (Norvasc) tablet 10 mg, 10 mg, oral, Daily, Frida Lott MD, 10 mg at 01/06/25 0835    atorvastatin (Lipitor) tablet 40 mg, 40 mg, oral, q PM, Frida Lott MD, 40 mg at 01/05/25 2141    dextrose 50 % injection 12.5 g, 12.5 g, intravenous, q15 min PRN, Frida Lott MD    dextrose 50 % injection 25 g, 25 g, intravenous, q15 min PRN, Frida Lott MD    digoxin (Lanoxin) tablet 125 mcg, 125 mcg, oral, Daily, Frida Lott MD, 125 mcg at 01/06/25 0835    gabapentin (Neurontin) capsule 300 mg, 300 mg, oral, TID, Frida Lott MD, 300 mg at 01/06/25 0836    glucagon (Glucagen) injection 1 mg, 1 mg, intramuscular, q15 min PRN, Frida Lott MD    glucagon (Glucagen) injection 1 mg, 1 mg, intramuscular, q15 min PRN, Frida Lott MD    hydrOXYzine HCL (Atarax) tablet 10 mg, 10 mg, oral, q8h PRN, Frida Lott MD    insulin glargine (Lantus) injection 20 Units, 20 Units, subcutaneous, Nightly, Frida Lott MD, 20 Units at 01/05/25 5840    insulin lispro injection 0-15 Units, 0-15 Units, subcutaneous, TID  AC, Frida Lott MD, 3 Units at 01/05/25 0955    lisinopril tablet 10 mg, 10 mg, oral, Daily, Frida Lott MD, 10 mg at 01/06/25 0835    meclizine (Antivert) tablet 12.5 mg, 12.5 mg, oral, TID PRN, Frida Lott MD    melatonin tablet 6 mg, 6 mg, oral, Nightly, Frida Lott MD, 6 mg at 01/05/25 0104    metoclopramide (Reglan) tablet 5 mg, 5 mg, oral, TID, Frida Lott MD, 5 mg at 01/06/25 0835    metoprolol tartrate (Lopressor) tablet 100 mg, 100 mg, oral, BID, Frida Lott MD, 100 mg at 01/06/25 0836    ondansetron ODT (Zofran-ODT) disintegrating tablet 4 mg, 4 mg, oral, q8h PRN, Frida Lott MD, 4 mg at 01/04/25 1707    [Held by provider] pantoprazole (ProtoNix) EC tablet 40 mg, 40 mg, oral, BID AC, Frida Lott MD, 40 mg at 01/05/25 0707    pantoprazole (ProtoNix) injection 40 mg, 40 mg, intravenous, BID, Jb Courtney MD, 40 mg at 01/06/25 0835    potassium chloride CR (Klor-Con M20) ER tablet 40 mEq, 40 mEq, oral, Once, Jb Courtney MD    Facility-Administered Medications Ordered in Other Encounters:     lidocaine (cardiac) (Xylocaine) injection, , intravenous, PRN, LORENZO Trejo, 50 mg at 01/06/25 0954    propofol (Diprivan) injection, , intravenous, PRN, LORENZO Trejo, 100 mcg/kg/min at 01/06/25 0956  Prior to Admission medications    Medication Sig Start Date End Date Taking? Authorizing Provider   amitriptyline (Elavil) 10 mg tablet Take 1 tablet (10 mg) by mouth once daily at bedtime. 7/27/23  Yes Historical Provider, MD   amLODIPine (Norvasc) 10 mg tablet Take 1 tablet (10 mg) by mouth once daily. 6/26/24  Yes Historical Provider, MD   aspirin 81 mg EC tablet Take 1 tablet (81 mg) by mouth 1 time. 12/17/23  Yes Historical Provider, MD   digoxin (Lanoxin) 125 MCG tablet Take 1 tablet (125 mcg) by mouth once daily.   Yes Historical Provider, MD   gabapentin (Neurontin) 300 mg capsule Take 1  capsule (300 mg) by mouth 3 times a day.   Yes Historical Provider, MD   hydrOXYzine HCL (Atarax) 10 mg tablet Take 1 tablet (10 mg) by mouth 3 times a day. 9/5/24  Yes Historical Provider, MD   lisinopril 10 mg tablet Take 1 tablet (10 mg) by mouth once daily.   Yes Historical Provider, MD   meclizine (Antivert) 12.5 mg tablet Take 1 tablet (12.5 mg) by mouth 3 times a day as needed for nausea. 7/18/24  Yes Historical Provider, MD   melatonin 3 mg tablet Take 2 tablets (6 mg) by mouth once daily at bedtime. 7/18/24 7/18/25 Yes Historical Provider, MD   metoclopramide (Reglan) 5 mg tablet Take 1 tablet (5 mg) by mouth 3 times a day.   Yes Historical Provider, MD   metoprolol tartrate (Lopressor) 100 mg tablet Take 1 tablet (100 mg) by mouth 2 times a day.   Yes Historical Provider, MD   pantoprazole (ProtoNix) 40 mg EC tablet Take 1 tablet (40 mg) by mouth 2 times a day. 6/26/24  Yes Historical Provider, MD   atorvastatin (Lipitor) 40 mg tablet Take 1 tablet (40 mg) by mouth once daily in the evening.    Historical Provider, MD     Allergies   Allergen Reactions    Dapagliflozin Itching    Metformin GI Upset    Naproxen Hives and Unknown     Makes eye red    Diazepam Itching    Insulin Glargine Itching     Hives    Sulfa (Sulfonamide Antibiotics) Rash and Unknown     Social History     Tobacco Use    Smoking status: Former     Current packs/day: 0.00     Average packs/day: 0.5 packs/day for 1 year (0.5 ttl pk-yrs)     Types: Cigarettes    Smokeless tobacco: Not on file   Substance Use Topics    Alcohol use: Never         Chemistry    Lab Results   Component Value Date/Time     01/06/2025 0745    K 3.3 (L) 01/06/2025 0745     01/06/2025 0745    CO2 27 01/06/2025 0745    BUN 8 01/06/2025 0745    CREATININE 0.57 01/06/2025 0745    Lab Results   Component Value Date/Time    CALCIUM 8.8 01/06/2025 0745    ALKPHOS 99 01/04/2025 1648    AST 11 01/04/2025 1648    ALT 13 01/04/2025 1648    BILITOT 0.4 01/04/2025  1648          Lab Results   Component Value Date/Time    WBC 4.5 01/06/2025 0745    HGB 12.2 01/06/2025 0745    HCT 35.6 (L) 01/06/2025 0745     01/06/2025 0745     Lab Results   Component Value Date/Time    PROTIME 11.9 01/04/2025 1648    INR 1.1 01/04/2025 1648     No results found for this or any previous visit (from the past 4464 hours).  No results found for this or any previous visit from the past 1095 days.        PMH- Anxiety and depression, Constipation, Diabetes type II, esophagitis, gastritis, H. pylori infection 2018), Hyperlipidemia, and Hypertension       Clinical information reviewed:    Allergies  Meds   Med Hx  Surg Hx   Fam Hx  Soc Hx        NPO Detail:  NPO/Void Status  Date of Last Liquid: 01/05/25  Time of Last Liquid: 2000  Date of Last Solid: 01/04/25  Time of Last Solid: 0600  Last Intake Type: Clear fluids  Time of Last Void: 0940         Physical Exam    Airway  Mallampati: II  TM distance: >3 FB  Neck ROM: full     Cardiovascular - normal exam  Rhythm: regular  Rate: normal     Dental - normal exam     Pulmonary - normal exam  Comments: Chronic productive cough   Abdominal - normal exam             Anesthesia Plan    History of general anesthesia?: yes  History of complications of general anesthesia?: no    ASA 3     MAC     The patient is not a current smoker.    Anesthetic plan and risks discussed with patient.  Use of blood products discussed with patient who consented to blood products.    Plan discussed with CRNA and attending.

## 2025-01-06 NOTE — ANESTHESIA POSTPROCEDURE EVALUATION
Patient: Sulma Kumari    Procedure Summary       Date: 01/06/25 Room / Location: [facility]    Anesthesia Start: 0948 Anesthesia Stop: 1015    Procedure: EGD Diagnosis: Melena    Scheduled Providers: Adilson Cantu MD Responsible Provider: Ramón Wagner MD    Anesthesia Type: MAC ASA Status: 3            Anesthesia Type: MAC    Vitals Value Taken Time   BP 96/55 01/06/25 1024   Temp  01/06/25 1024   Pulse 66 01/06/25 1024   Resp 12 01/06/25 1024   SpO2 96 01/06/25 1024       Anesthesia Post Evaluation    Patient location during evaluation: bedside  Patient participation: complete - patient participated  Level of consciousness: awake and awake and alert  Pain score: 0  Pain management: adequate  Airway patency: patent  Cardiovascular status: acceptable  Respiratory status: acceptable  Hydration status: acceptable  Postoperative Nausea and Vomiting: none        No notable events documented.

## 2025-01-06 NOTE — PROGRESS NOTES
01/06/25 1600   Discharge Planning   Living Arrangements Children   Support Systems Children   Assistance Needed no   Type of Residence Private residence   Who is requesting discharge planning? Provider   Home or Post Acute Services None   Expected Discharge Disposition Home   Does the patient need discharge transport arranged? No   Financial Resource Strain   How hard is it for you to pay for the very basics like food, housing, medical care, and heating? Not hard   Housing Stability   In the last 12 months, was there a time when you were not able to pay the mortgage or rent on time? N   In the past 12 months, how many times have you moved where you were living? 0   At any time in the past 12 months, were you homeless or living in a shelter (including now)? N   Transportation Needs   In the past 12 months, has lack of transportation kept you from medical appointments or from getting medications? no   In the past 12 months, has lack of transportation kept you from meetings, work, or from getting things needed for daily living? No   Patient Choice   Provider Choice list and CMS website (https://medicare.gov/care-compare#search) for post-acute Quality and Resource Measure Data were provided and reviewed with: Patient   Patient / Family choosing to utilize agency / facility established prior to hospitalization No   Stroke Family Assessment   Stroke Family Assessment Needed No   Intensity of Service   Intensity of Service 0-30 min        01/06/25 1600   Discharge Planning   Living Arrangements Children   Support Systems Children   Assistance Needed no   Type of Residence Private residence   Who is requesting discharge planning? Provider   Home or Post Acute Services None   Expected Discharge Disposition Home   Does the patient need discharge transport arranged? No   Financial Resource Strain   How hard is it for you to pay for the very basics like food, housing, medical care, and heating? Not hard   Housing Stability    In the last 12 months, was there a time when you were not able to pay the mortgage or rent on time? N   In the past 12 months, how many times have you moved where you were living? 0   At any time in the past 12 months, were you homeless or living in a shelter (including now)? N   Transportation Needs   In the past 12 months, has lack of transportation kept you from medical appointments or from getting medications? no   In the past 12 months, has lack of transportation kept you from meetings, work, or from getting things needed for daily living? No   Patient Choice   Provider Choice list and CMS website (https://medicare.gov/care-compare#search) for post-acute Quality and Resource Measure Data were provided and reviewed with: Patient   Patient / Family choosing to utilize agency / facility established prior to hospitalization No   Stroke Family Assessment   Stroke Family Assessment Needed No   Intensity of Service   Intensity of Service 0-30 min     Transitional Care Coordination Progress Note:  Patient discussed during interdisciplinary rounds.   Team members present: MD and TCC  Plan per Medical/Surgical team: Patient getting EGD today then home tomorrow if doing good.  Payor: Jonah DIAZ  Discharge disposition: Home  Potential Barriers: None  ADOD: 01/07/25

## 2025-01-07 LAB
ALBUMIN SERPL BCP-MCNC: 3.5 G/DL (ref 3.4–5)
ANION GAP SERPL CALC-SCNC: 13 MMOL/L (ref 10–20)
BUN SERPL-MCNC: 10 MG/DL (ref 6–23)
CALCIUM SERPL-MCNC: 9.1 MG/DL (ref 8.6–10.6)
CHLORIDE SERPL-SCNC: 105 MMOL/L (ref 98–107)
CO2 SERPL-SCNC: 24 MMOL/L (ref 21–32)
CREAT SERPL-MCNC: 0.58 MG/DL (ref 0.5–1.05)
EGFRCR SERPLBLD CKD-EPI 2021: >90 ML/MIN/1.73M*2
ERYTHROCYTE [DISTWIDTH] IN BLOOD BY AUTOMATED COUNT: 13.2 % (ref 11.5–14.5)
GLUCOSE BLD MANUAL STRIP-MCNC: 135 MG/DL (ref 74–99)
GLUCOSE BLD MANUAL STRIP-MCNC: 137 MG/DL (ref 74–99)
GLUCOSE BLD MANUAL STRIP-MCNC: 146 MG/DL (ref 74–99)
GLUCOSE BLD MANUAL STRIP-MCNC: 218 MG/DL (ref 74–99)
GLUCOSE BLD MANUAL STRIP-MCNC: 86 MG/DL (ref 74–99)
GLUCOSE SERPL-MCNC: 157 MG/DL (ref 74–99)
HCT VFR BLD AUTO: 34.6 % (ref 36–46)
HGB BLD-MCNC: 11.5 G/DL (ref 12–16)
MCH RBC QN AUTO: 29.8 PG (ref 26–34)
MCHC RBC AUTO-ENTMCNC: 33.2 G/DL (ref 32–36)
MCV RBC AUTO: 90 FL (ref 80–100)
NRBC BLD-RTO: 0 /100 WBCS (ref 0–0)
PHOSPHATE SERPL-MCNC: 3.4 MG/DL (ref 2.5–4.9)
PLATELET # BLD AUTO: 418 X10*3/UL (ref 150–450)
POTASSIUM SERPL-SCNC: 4.1 MMOL/L (ref 3.5–5.3)
RBC # BLD AUTO: 3.86 X10*6/UL (ref 4–5.2)
SODIUM SERPL-SCNC: 138 MMOL/L (ref 136–145)
WBC # BLD AUTO: 5.9 X10*3/UL (ref 4.4–11.3)

## 2025-01-07 PROCEDURE — 36415 COLL VENOUS BLD VENIPUNCTURE: CPT | Performed by: STUDENT IN AN ORGANIZED HEALTH CARE EDUCATION/TRAINING PROGRAM

## 2025-01-07 PROCEDURE — 2500000001 HC RX 250 WO HCPCS SELF ADMINISTERED DRUGS (ALT 637 FOR MEDICARE OP): Performed by: STUDENT IN AN ORGANIZED HEALTH CARE EDUCATION/TRAINING PROGRAM

## 2025-01-07 PROCEDURE — 82947 ASSAY GLUCOSE BLOOD QUANT: CPT

## 2025-01-07 PROCEDURE — 99233 SBSQ HOSP IP/OBS HIGH 50: CPT | Performed by: STUDENT IN AN ORGANIZED HEALTH CARE EDUCATION/TRAINING PROGRAM

## 2025-01-07 PROCEDURE — 85027 COMPLETE CBC AUTOMATED: CPT | Performed by: STUDENT IN AN ORGANIZED HEALTH CARE EDUCATION/TRAINING PROGRAM

## 2025-01-07 PROCEDURE — 2500000002 HC RX 250 W HCPCS SELF ADMINISTERED DRUGS (ALT 637 FOR MEDICARE OP, ALT 636 FOR OP/ED)

## 2025-01-07 PROCEDURE — 1100000001 HC PRIVATE ROOM DAILY

## 2025-01-07 PROCEDURE — 2500000001 HC RX 250 WO HCPCS SELF ADMINISTERED DRUGS (ALT 637 FOR MEDICARE OP)

## 2025-01-07 PROCEDURE — 80069 RENAL FUNCTION PANEL: CPT | Performed by: STUDENT IN AN ORGANIZED HEALTH CARE EDUCATION/TRAINING PROGRAM

## 2025-01-07 RX ORDER — TRAMADOL HYDROCHLORIDE 50 MG/1
50 TABLET ORAL EVERY 8 HOURS PRN
Status: DISCONTINUED | OUTPATIENT
Start: 2025-01-07 | End: 2025-01-09 | Stop reason: HOSPADM

## 2025-01-07 RX ADMIN — INSULIN LISPRO 6 UNITS: 100 INJECTION, SOLUTION INTRAVENOUS; SUBCUTANEOUS at 16:36

## 2025-01-07 RX ADMIN — PANTOPRAZOLE SODIUM 40 MG: 40 TABLET, DELAYED RELEASE ORAL at 16:28

## 2025-01-07 RX ADMIN — TRAMADOL HYDROCHLORIDE 50 MG: 50 TABLET, COATED ORAL at 09:10

## 2025-01-07 RX ADMIN — INSULIN GLARGINE 20 UNITS: 100 INJECTION, SOLUTION SUBCUTANEOUS at 22:03

## 2025-01-07 RX ADMIN — GABAPENTIN 300 MG: 300 CAPSULE ORAL at 14:43

## 2025-01-07 RX ADMIN — METOPROLOL TARTRATE 100 MG: 50 TABLET, FILM COATED ORAL at 08:52

## 2025-01-07 RX ADMIN — GABAPENTIN 300 MG: 300 CAPSULE ORAL at 08:52

## 2025-01-07 RX ADMIN — PANTOPRAZOLE SODIUM 40 MG: 40 TABLET, DELAYED RELEASE ORAL at 06:03

## 2025-01-07 RX ADMIN — GABAPENTIN 300 MG: 300 CAPSULE ORAL at 22:03

## 2025-01-07 RX ADMIN — ATORVASTATIN CALCIUM 40 MG: 40 TABLET, FILM COATED ORAL at 22:03

## 2025-01-07 RX ADMIN — AMLODIPINE BESYLATE 10 MG: 10 TABLET ORAL at 08:52

## 2025-01-07 RX ADMIN — DIGOXIN 125 MCG: 125 TABLET ORAL at 08:52

## 2025-01-07 RX ADMIN — METOPROLOL TARTRATE 100 MG: 50 TABLET, FILM COATED ORAL at 22:03

## 2025-01-07 RX ADMIN — METOCLOPRAMIDE 5 MG: 10 TABLET ORAL at 22:03

## 2025-01-07 RX ADMIN — METOCLOPRAMIDE 5 MG: 10 TABLET ORAL at 14:43

## 2025-01-07 RX ADMIN — LISINOPRIL 10 MG: 10 TABLET ORAL at 08:52

## 2025-01-07 RX ADMIN — METOCLOPRAMIDE 5 MG: 10 TABLET ORAL at 08:52

## 2025-01-07 ASSESSMENT — COGNITIVE AND FUNCTIONAL STATUS - GENERAL
DAILY ACTIVITIY SCORE: 24
MOBILITY SCORE: 24

## 2025-01-07 ASSESSMENT — PAIN - FUNCTIONAL ASSESSMENT
PAIN_FUNCTIONAL_ASSESSMENT: 0-10
PAIN_FUNCTIONAL_ASSESSMENT: 0-10

## 2025-01-07 ASSESSMENT — PAIN SCALES - GENERAL
PAINLEVEL_OUTOF10: 8
PAINLEVEL_OUTOF10: 0 - NO PAIN
PAINLEVEL_OUTOF10: 8

## 2025-01-07 ASSESSMENT — PAIN SCALES - WONG BAKER: WONGBAKER_NUMERICALRESPONSE: NO HURT

## 2025-01-07 NOTE — CARE PLAN
Problem: Pain - Adult  Goal: Verbalizes/displays adequate comfort level or baseline comfort level  Outcome: Progressing     Problem: Safety - Adult  Goal: Free from fall injury  Outcome: Progressing     Problem: Discharge Planning  Goal: Discharge to home or other facility with appropriate resources  Outcome: Progressing     Problem: Chronic Conditions and Co-morbidities  Goal: Patient's chronic conditions and co-morbidity symptoms are monitored and maintained or improved  Outcome: Progressing     Problem: Bowel Elimination  Goal: LTG - I will have regular and routine bowel evacuation  Outcome: Progressing  Goal: STG - I maintain skin integrity  Outcome: Progressing  Goal: STG - I will be accident free/continent  Outcome: Progressing  Goal: STG - I will be continent of stool  Outcome: Progressing  Goal: STG - I will verbalize knowledge about relationship between diet, fluid intake, activity and medication on constipation  Outcome: Progressing     Problem: Bladder/Voiding  Goal: STG - I demonstrate no accidents  Outcome: Progressing  Goal: STG - I will remain continent  Outcome: Progressing

## 2025-01-07 NOTE — CARE PLAN
The patient's goals for the shift include pt will rate pain 5/10 or less    The clinical goals for the shift include pt will have decrease in number of watery stools

## 2025-01-08 LAB
ALBUMIN SERPL BCP-MCNC: 3.4 G/DL (ref 3.4–5)
ANION GAP SERPL CALC-SCNC: 12 MMOL/L (ref 10–20)
APPEARANCE UR: ABNORMAL
BACTERIA #/AREA URNS AUTO: ABNORMAL /HPF
BASOPHILS # BLD AUTO: 0.02 X10*3/UL (ref 0–0.1)
BASOPHILS NFR BLD AUTO: 0.4 %
BILIRUB UR STRIP.AUTO-MCNC: NEGATIVE MG/DL
BUN SERPL-MCNC: 8 MG/DL (ref 6–23)
CALCIUM SERPL-MCNC: 9 MG/DL (ref 8.6–10.6)
CHLORIDE SERPL-SCNC: 105 MMOL/L (ref 98–107)
CO2 SERPL-SCNC: 26 MMOL/L (ref 21–32)
COLOR UR: ABNORMAL
CREAT SERPL-MCNC: 0.58 MG/DL (ref 0.5–1.05)
EGFRCR SERPLBLD CKD-EPI 2021: >90 ML/MIN/1.73M*2
EOSINOPHIL # BLD AUTO: 0.24 X10*3/UL (ref 0–0.7)
EOSINOPHIL NFR BLD AUTO: 4.6 %
ERYTHROCYTE [DISTWIDTH] IN BLOOD BY AUTOMATED COUNT: 13.3 % (ref 11.5–14.5)
GLUCOSE BLD MANUAL STRIP-MCNC: 156 MG/DL (ref 74–99)
GLUCOSE BLD MANUAL STRIP-MCNC: 165 MG/DL (ref 74–99)
GLUCOSE BLD MANUAL STRIP-MCNC: 201 MG/DL (ref 74–99)
GLUCOSE BLD MANUAL STRIP-MCNC: 77 MG/DL (ref 74–99)
GLUCOSE SERPL-MCNC: 215 MG/DL (ref 74–99)
GLUCOSE UR STRIP.AUTO-MCNC: NORMAL MG/DL
HCT VFR BLD AUTO: 34.5 % (ref 36–46)
HGB BLD-MCNC: 11.3 G/DL (ref 12–16)
IMM GRANULOCYTES # BLD AUTO: 0.03 X10*3/UL (ref 0–0.7)
IMM GRANULOCYTES NFR BLD AUTO: 0.6 % (ref 0–0.9)
KETONES UR STRIP.AUTO-MCNC: NEGATIVE MG/DL
LAB AP ASR DISCLAIMER: NORMAL
LABORATORY COMMENT REPORT: NORMAL
LEUKOCYTE ESTERASE UR QL STRIP.AUTO: ABNORMAL
LYMPHOCYTES # BLD AUTO: 2.46 X10*3/UL (ref 1.2–4.8)
LYMPHOCYTES NFR BLD AUTO: 46.8 %
MCH RBC QN AUTO: 29.3 PG (ref 26–34)
MCHC RBC AUTO-ENTMCNC: 32.8 G/DL (ref 32–36)
MCV RBC AUTO: 89 FL (ref 80–100)
MONOCYTES # BLD AUTO: 0.44 X10*3/UL (ref 0.1–1)
MONOCYTES NFR BLD AUTO: 8.4 %
MUCOUS THREADS #/AREA URNS AUTO: ABNORMAL /LPF
NEUTROPHILS # BLD AUTO: 2.07 X10*3/UL (ref 1.2–7.7)
NEUTROPHILS NFR BLD AUTO: 39.2 %
NITRITE UR QL STRIP.AUTO: ABNORMAL
NRBC BLD-RTO: 0 /100 WBCS (ref 0–0)
PATH REPORT.FINAL DX SPEC: NORMAL
PATH REPORT.GROSS SPEC: NORMAL
PATH REPORT.TOTAL CANCER: NORMAL
PH UR STRIP.AUTO: 5.5 [PH]
PHOSPHATE SERPL-MCNC: 3.4 MG/DL (ref 2.5–4.9)
PLATELET # BLD AUTO: 379 X10*3/UL (ref 150–450)
POTASSIUM SERPL-SCNC: 3.9 MMOL/L (ref 3.5–5.3)
PROT UR STRIP.AUTO-MCNC: NEGATIVE MG/DL
RBC # BLD AUTO: 3.86 X10*6/UL (ref 4–5.2)
RBC # UR STRIP.AUTO: NEGATIVE /UL
RBC #/AREA URNS AUTO: ABNORMAL /HPF
SODIUM SERPL-SCNC: 139 MMOL/L (ref 136–145)
SP GR UR STRIP.AUTO: 1.01
SQUAMOUS #/AREA URNS AUTO: ABNORMAL /HPF
UROBILINOGEN UR STRIP.AUTO-MCNC: NORMAL MG/DL
WBC # BLD AUTO: 5.3 X10*3/UL (ref 4.4–11.3)
WBC #/AREA URNS AUTO: ABNORMAL /HPF
WBC CLUMPS #/AREA URNS AUTO: ABNORMAL /HPF

## 2025-01-08 PROCEDURE — 1100000001 HC PRIVATE ROOM DAILY

## 2025-01-08 PROCEDURE — 2500000004 HC RX 250 GENERAL PHARMACY W/ HCPCS (ALT 636 FOR OP/ED): Performed by: STUDENT IN AN ORGANIZED HEALTH CARE EDUCATION/TRAINING PROGRAM

## 2025-01-08 PROCEDURE — 80069 RENAL FUNCTION PANEL: CPT | Performed by: STUDENT IN AN ORGANIZED HEALTH CARE EDUCATION/TRAINING PROGRAM

## 2025-01-08 PROCEDURE — 2500000001 HC RX 250 WO HCPCS SELF ADMINISTERED DRUGS (ALT 637 FOR MEDICARE OP)

## 2025-01-08 PROCEDURE — 99231 SBSQ HOSP IP/OBS SF/LOW 25: CPT | Performed by: STUDENT IN AN ORGANIZED HEALTH CARE EDUCATION/TRAINING PROGRAM

## 2025-01-08 PROCEDURE — 85025 COMPLETE CBC W/AUTO DIFF WBC: CPT | Performed by: STUDENT IN AN ORGANIZED HEALTH CARE EDUCATION/TRAINING PROGRAM

## 2025-01-08 PROCEDURE — 82947 ASSAY GLUCOSE BLOOD QUANT: CPT

## 2025-01-08 PROCEDURE — 2500000002 HC RX 250 W HCPCS SELF ADMINISTERED DRUGS (ALT 637 FOR MEDICARE OP, ALT 636 FOR OP/ED)

## 2025-01-08 PROCEDURE — 81001 URINALYSIS AUTO W/SCOPE: CPT | Performed by: STUDENT IN AN ORGANIZED HEALTH CARE EDUCATION/TRAINING PROGRAM

## 2025-01-08 PROCEDURE — 2500000001 HC RX 250 WO HCPCS SELF ADMINISTERED DRUGS (ALT 637 FOR MEDICARE OP): Performed by: STUDENT IN AN ORGANIZED HEALTH CARE EDUCATION/TRAINING PROGRAM

## 2025-01-08 PROCEDURE — 36415 COLL VENOUS BLD VENIPUNCTURE: CPT | Performed by: STUDENT IN AN ORGANIZED HEALTH CARE EDUCATION/TRAINING PROGRAM

## 2025-01-08 RX ORDER — INSULIN DETEMIR 100 [IU]/ML
24 INJECTION, SOLUTION SUBCUTANEOUS DAILY
COMMUNITY

## 2025-01-08 RX ORDER — POLYETHYLENE GLYCOL 3350 17 G/17G
17 POWDER, FOR SOLUTION ORAL 2 TIMES DAILY PRN
Status: DISCONTINUED | OUTPATIENT
Start: 2025-01-08 | End: 2025-01-09 | Stop reason: HOSPADM

## 2025-01-08 RX ORDER — GUAIFENESIN 100 MG/5ML
200 SOLUTION ORAL EVERY 4 HOURS PRN
Status: DISCONTINUED | OUTPATIENT
Start: 2025-01-08 | End: 2025-01-09 | Stop reason: HOSPADM

## 2025-01-08 RX ORDER — SEMAGLUTIDE 1.34 MG/ML
0.5 INJECTION, SOLUTION SUBCUTANEOUS WEEKLY
COMMUNITY

## 2025-01-08 RX ORDER — INSULIN LISPRO 100 [IU]/ML
INJECTION, SOLUTION INTRAVENOUS; SUBCUTANEOUS
COMMUNITY

## 2025-01-08 RX ADMIN — INSULIN GLARGINE 20 UNITS: 100 INJECTION, SOLUTION SUBCUTANEOUS at 20:37

## 2025-01-08 RX ADMIN — METOCLOPRAMIDE 5 MG: 10 TABLET ORAL at 20:33

## 2025-01-08 RX ADMIN — DIGOXIN 125 MCG: 125 TABLET ORAL at 08:57

## 2025-01-08 RX ADMIN — PANTOPRAZOLE SODIUM 40 MG: 40 TABLET, DELAYED RELEASE ORAL at 15:56

## 2025-01-08 RX ADMIN — METOPROLOL TARTRATE 100 MG: 50 TABLET, FILM COATED ORAL at 20:33

## 2025-01-08 RX ADMIN — AMITRIPTYLINE HYDROCHLORIDE 10 MG: 10 TABLET, FILM COATED ORAL at 20:36

## 2025-01-08 RX ADMIN — POLYETHYLENE GLYCOL 3350 17 G: 17 POWDER, FOR SOLUTION ORAL at 21:13

## 2025-01-08 RX ADMIN — ATORVASTATIN CALCIUM 40 MG: 40 TABLET, FILM COATED ORAL at 20:34

## 2025-01-08 RX ADMIN — INSULIN LISPRO 3 UNITS: 100 INJECTION, SOLUTION INTRAVENOUS; SUBCUTANEOUS at 18:00

## 2025-01-08 RX ADMIN — GABAPENTIN 300 MG: 300 CAPSULE ORAL at 08:55

## 2025-01-08 RX ADMIN — METOCLOPRAMIDE 5 MG: 10 TABLET ORAL at 15:56

## 2025-01-08 RX ADMIN — METOPROLOL TARTRATE 100 MG: 50 TABLET, FILM COATED ORAL at 08:55

## 2025-01-08 RX ADMIN — GABAPENTIN 300 MG: 300 CAPSULE ORAL at 20:34

## 2025-01-08 RX ADMIN — METOCLOPRAMIDE 5 MG: 10 TABLET ORAL at 08:55

## 2025-01-08 RX ADMIN — GABAPENTIN 300 MG: 300 CAPSULE ORAL at 15:56

## 2025-01-08 RX ADMIN — HYDROXYZINE HYDROCHLORIDE 10 MG: 10 TABLET ORAL at 06:48

## 2025-01-08 RX ADMIN — INSULIN LISPRO 6 UNITS: 100 INJECTION, SOLUTION INTRAVENOUS; SUBCUTANEOUS at 08:42

## 2025-01-08 RX ADMIN — PANTOPRAZOLE SODIUM 40 MG: 40 TABLET, DELAYED RELEASE ORAL at 06:48

## 2025-01-08 RX ADMIN — LISINOPRIL 10 MG: 10 TABLET ORAL at 08:54

## 2025-01-08 RX ADMIN — AMLODIPINE BESYLATE 10 MG: 10 TABLET ORAL at 08:54

## 2025-01-08 ASSESSMENT — COGNITIVE AND FUNCTIONAL STATUS - GENERAL
MOBILITY SCORE: 24
DAILY ACTIVITIY SCORE: 24

## 2025-01-08 ASSESSMENT — PAIN - FUNCTIONAL ASSESSMENT: PAIN_FUNCTIONAL_ASSESSMENT: 0-10

## 2025-01-08 ASSESSMENT — PAIN SCALES - GENERAL: PAINLEVEL_OUTOF10: 0 - NO PAIN

## 2025-01-08 NOTE — CONSULTS
"Nutrition Assessment      Reason for Assessment: Provider consult order - Comments: Foods to eat without gallbladder and diabetes. Weight loss 14lbs. Within 2 weeks.     Sulma Kumari is a 64 y.o. female w/ PMHx of Anxiety and depression, Constipation, Diabetes type II, esophagitis, gastritis, H. pylori infection 2018), Hyperlipidemia, and Hypertension presenting for 2 weeks of congestion and cough with 2 days of vomiting and black watery stools.     EGD 1/6/25 demonstrating Grade A esophagitis, biopsies taken to r/o vadimylori.    Nutrition History:  Energy Intake: Good > 75 % (Patient reports eating 100% of a  salad with Egg, Cheese, Turkey and Ham for lunch today.)  Food and Nutrient History: Patient reports not eating well the past couple of weeks due to N/V/D. States she feels much better and wants to \"start eating right\". Reports having her gallbladder removed < 2 years ago and experiences occasional GI symptoms. States she's had diabetes for decades and has been educated on diet before, but hasn't been motivated to follow recommendations until now. States she lost weight since she was last weighed at [facility] last month. Denies GI discomfort currently, but reports no BM x 3 days. Meds include prn Miralax, but patient states she hasn't taken it yet. ?  Vitamin/Herbal Supplement Use: None per patient.  Food Allergies/Intolerances:  Denies   Oral Problems: None       Anthropometrics:  Height: 165.1 cm (5' 5\")   Weight: 74.3 kg (163.4  lb)  BMI (Calculated): 28.96  IBW/kg (Dietitian Calculated): 56.8 kg  Percent of IBW: 130.8 %  Adjusted Body Weight (kg): 61.2 kg    Weight History:   Wt Readings from Last 10 Encounters:   01/04/25 78.9 kg (174 lb). Not correct. Patient states she wasn't weighed on admission.      Weight Change %:  Weight History / % Weight Change: This Clinician brought standing scale into patient's room. Wt obtained at 163.4 lbs.  Patient reports wt was 174 lbs \"the end of last month\".  " Based on patient recall, 6% wt loss x 2 weeks.  Significant Weight Loss: Yes    Nutrition Focused Physical Exam Findings:  6% wt  Subcutaneous Fat Loss:   Orbital Fat Pads: Mild-Moderate (slight dark circles and slight hollowing)  Buccal Fat Pads: Mild-Moderate (flat cheeks, minimal bounce)  Triceps: Mild-Moderate (less than ample fat tissue)  Ribs: Defer  Muscle Wasting:  Temporalis: Mild-Moderate (slight depression)  Pectoralis (Clavicular Region): Mild-Moderate (some protrusion of clavicle)  Deltoid/Trapezius: Well nourished (rounded appearance at arm, shoulder, neck)  Interosseous: Mild-Moderate (slightly depressed area between thumb and forefinger)  Trapezius/Infraspinatus/Supraspinatus (Scapular Region): Well nourished (bones not prominent, muscle taut)  Quadriceps: Well nourished (well developed, well rounded)  Gastrocnemius: Well nourished (well developed bulbous muscle)  Edema:  Edema: none  Physical Findings:  Hair: Negative  Eyes: Negative  Mouth: Negative  Nails: Negative  Skin: Negative    Nutrition Significant Labs:  CBC Trend:   Results from last 7 days   Lab Units 01/08/25  0540 01/07/25  0715 01/06/25  0745 01/05/25  1557   WBC AUTO x10*3/uL 5.3 5.9 4.5 5.4   RBC AUTO x10*6/uL 3.86* 3.86* 4.09 4.10   HEMOGLOBIN g/dL 11.3* 11.5* 12.2 12.2   HEMATOCRIT % 34.5* 34.6* 35.6* 34.6*   MCV fL 89 90 87 84   PLATELETS AUTO x10*3/uL 379 418 429 411    , BMP Trend:   Results from last 7 days   Lab Units 01/08/25  0540 01/07/25  0715 01/06/25  0745 01/05/25  1557   GLUCOSE mg/dL 215* 157* 104* 117*   CALCIUM mg/dL 9.0 9.1 8.8 8.9   SODIUM mmol/L 139 138 141 139   POTASSIUM mmol/L 3.9 4.1 3.3* 3.8   CO2 mmol/L 26 24 27 25   CHLORIDE mmol/L 105 105 107 104   BUN mg/dL 8 10 8 13   CREATININE mg/dL 0.58 0.58 0.57 0.57    , A1C:  Lab Results   Component Value Date    HGBA1C 13.4 (H) 01/05/2025   , BG POCT trend:   Results from last 7 days   Lab Units 01/08/25  1135 01/08/25  0733 01/07/25  2145 01/07/25 2007  "01/07/25  1633   POCT GLUCOSE mg/dL 77 201* 137* 146* 218*    , Liver Function Trend:   Results from last 7 days   Lab Units 01/04/25  1648   ALK PHOS U/L 99   AST U/L 11   ALT U/L 13   BILIRUBIN TOTAL mg/dL 0.4    , Renal Lab Trend:   Results from last 7 days   Lab Units 01/08/25  0540 01/07/25  0715 01/06/25  0745 01/05/25  1557   POTASSIUM mmol/L 3.9 4.1 3.3* 3.8   PHOSPHORUS mg/dL 3.4 3.4 3.2 2.3*   SODIUM mmol/L 139 138 141 139   MAGNESIUM mg/dL  --   --   --  1.89   EGFR mL/min/1.73m*2 >90 >90 >90 >90   BUN mg/dL 8 10 8 13   CREATININE mg/dL 0.58 0.58 0.57 0.57    , Vit D: No results found for: \"VITD25\" , Vit B12: No results found for: \"RIJLWEVY20\"     Nutrition Specific Medications:    Scheduled medications  amitriptyline, 10 mg, oral, Nightly  amLODIPine, 10 mg, oral, Daily  atorvastatin, 40 mg, oral, q PM  digoxin, 125 mcg, oral, Daily  gabapentin, 300 mg, oral, TID  insulin glargine, 20 Units, subcutaneous, Nightly  insulin lispro, 0-15 Units, subcutaneous, TID AC  lisinopril, 10 mg, oral, Daily  melatonin, 6 mg, oral, Nightly  metoclopramide, 5 mg, oral, TID  metoprolol tartrate, 100 mg, oral, BID  pantoprazole, 40 mg, oral, BID AC      Continuous medications     PRN medications  PRN medications: acetaminophen, dextrose, dextrose, glucagon, glucagon, guaiFENesin, hydrOXYzine HCL, meclizine, ondansetron ODT, polyethylene glycol, traMADol    I/O:   Last BM Date: 01/06/25;      Dietary Orders (From admission, onward)       Start     Ordered    01/06/25 1249  Adult diet Consistent Carb; CCD 75 gm/meal  Diet effective now        Question Answer Comment   Diet type Consistent Carb    Carb diet selection: CCD 75 gm/meal        01/06/25 1248                     Estimated Needs:   Total Energy Estimated Needs (kCal): 1850 kCal  Method for Estimating Needs: 61.2 kg / 30 kcal  Total Protein Estimated Needs (g): 75 g  Method for Estimating Needs: 61.2 kg / 1.2 g              Nutrition Diagnosis   Malnutrition " "Diagnosis  Patient has Malnutrition Diagnosis: Yes  Diagnosis Status: New  Malnutrition Diagnosis: Moderate malnutrition related to acute disease or injury  As Evidenced by: meeting < 75% of EER for > 1 week, 6% wt loss and 2 weeks and mild muscle wasting and fat loss per physical            Nutrition Interventions/Recommendations         Nutrition Prescription:  Individualized Nutrition Prescription Provided for : CCD 75 gm/meal diet. Order daily MVI. Obtain Vitamin D level.        Nutrition Interventions:   Interventions: Meals and snacks  Meals and Snacks: General healthful diet         Nutrition Education:   Discussed Low Fat diet for digestive diseases. Provided patient withi handout 'Fat Restricted Nutrition Therapy' which lists healthy foods to choose and high fat foods to avoid. Provided handout \"How to Eat When You Have Diabetes\" and discussed consistent carb intake with meals, correct portion sizes and importance of including protein with meals and snacks.        Nutrition Monitoring and Evaluation   Food/Nutrient Related History Monitoring  Monitoring and Evaluation Plan: Energy intake  Criteria: >/= 75% of meals/supplements    Body Composition/Growth/Weight History  Monitoring and Evaluation Plan: Weight  Criteria: Stable weight    Biochemical Data, Medical Tests and Procedures  Monitoring and Evaluation Plan: Electrolyte/renal panel, Glucose/endocrine profile  Criteria: Labs wnl              Time Spent (min): 45 minutes  "

## 2025-01-08 NOTE — PROGRESS NOTES
Assessment/Plan       Sulma Kumari is a 64 y.o. female w/ PMHx of Anxiety and depression, Constipation, Diabetes type II, esophagitis, gastritis, H. pylori infection 2018), Hyperlipidemia, and Hypertension presenting for 2 weeks of congestion and cough followed by recurrent n/v and then diarrhea with black stools. Egd with esophagitis. h/h is stable. On ppi bid to go on.  Follow up op with gi. Follow up path.     -C diff is less likely giving normal bowel movement over the past two days.  She wasnt having profuse diarrhea when saw her so not concerned. said she had diarrhea 2ish wks ago but the frequency of her stools were improving  -GI follow up in fellows clinic after discharge  -Can be discharged home tomorrow   -[place]       -------------------------------      Melena c/f UGIB  Acute blood loss anemia  H/o esophogitis  H/o PUD, h. pylori  - in setting of h/o esophogitis, gastric ulcer, h. Pylori  - in setting of recurrent nausea and vomitting, possible annalise keri in setting of n/v.   - ct angio limited, did not show active bleed however.   - HDS, does feel dizzy. H/h has remained stable, repeat showed probably false low value obtained.  - trend h/h, repeat this afternoon. Active type and screen. Consented for blood. PPI BID IV. Appreciate gi consult recs.   - egd showed grade a esophogitis, biopsies taken.   - ok for daily ppi, will adjust. Monitor h/h overnight and bowel movements.     Dizziness  - check orthostatics    Viral prodrome: URI, gastroenteritis.   - with cough, congestion, n/v and diarrhea. Pt also reports possible suspcious food ingeston prior  - overal improving. Continue symptomatic management  - monitoring stools as above  - send stool pcr, c. Dif for diarrhea. Will dc c. Dif at this time no specimen given. Will continue to monitor for diarrhea if present will collect stool pcr.       Uncontrolled DMII  - A1c 13  - on glrgine 28 units daily and ozempic and ssi at home  - started  "glargine 20 units daily, ssi  - glc low in setting of being on cld then npo. Will monitor for adjustments as pt advances diet.     Scar on liver  - seen on ct  - pt after anesthesia reporting a bowel movement with some LLQ discmfort, she complained that she felt somethng was wrong with her liver. Pt was educated and counseled.  - repeat lft torrow. Prior lft stable. Albumin/inr ok, synthetic fucntion normal. Low concern for liver disease.        HTN  - close monitoring due to concern for bleed  - continue amlidpine, lisinpril, metop     Hypokalemia  - repeat rfp this afternoon. Check magnesium    Afib  - on digoxin and metop       Scheduled outpatient appointments in system:   No future appointments.  ---------------------------------------------------------------------------------------------------  Subjective   NE     ---------------------------------------------------------------------------------------------------  Objective   Last Recorded Vitals  Blood pressure 117/59, pulse 74, temperature 36.2 °C (97.2 °F), resp. rate 19, height 1.651 m (5' 5\"), weight 78.9 kg (174 lb), SpO2 97%.  Intake/Output last 3 Shifts:  I/O last 3 completed shifts:  In: - (0 mL/kg)   Out: 900 (11.4 mL/kg) [Urine:900 (0.3 mL/kg/hr)]  Weight: 78.9 kg     Physical Exam  Vitals and nursing note reviewed.   Constitutional:       General: She is not in acute distress.     Appearance: Normal appearance. She is not ill-appearing or toxic-appearing.   HENT:      Head: Normocephalic and atraumatic.      Mouth/Throat:      Mouth: Mucous membranes are moist.   Eyes:      General: No scleral icterus.     Extraocular Movements: Extraocular movements intact.      Conjunctiva/sclera: Conjunctivae normal.   Cardiovascular:      Rate and Rhythm: Normal rate and regular rhythm.      Heart sounds: S1 normal and S2 normal. No murmur heard.  Pulmonary:      Effort: Pulmonary effort is normal. No respiratory distress.      Breath sounds: No wheezing, " rhonchi or rales.   Abdominal:      General: Bowel sounds are normal. There is no distension.      Palpations: Abdomen is soft.      Tenderness: There is no abdominal tenderness. There is no guarding or rebound.      Comments: Non-tender, nonacute abdomen, no hepatomegaly noted   Musculoskeletal:         General: No swelling or deformity.      Cervical back: Neck supple.   Skin:     General: Skin is warm and dry.      Findings: No rash.   Neurological:      General: No focal deficit present.      Mental Status: She is alert. Mental status is at baseline.   Psychiatric:         Mood and Affect: Mood normal.         Relevant Results  Lab Results   Component Value Date    WBC 5.3 01/08/2025    HGB 11.3 (L) 01/08/2025    HCT 34.5 (L) 01/08/2025    MCV 89 01/08/2025     01/08/2025      Lab Results   Component Value Date    GLUCOSE 215 (H) 01/08/2025    CALCIUM 9.0 01/08/2025     01/08/2025    K 3.9 01/08/2025    CO2 26 01/08/2025     01/08/2025    BUN 8 01/08/2025    CREATININE 0.58 01/08/2025     Scheduled medications  amitriptyline, 10 mg, oral, Nightly  amLODIPine, 10 mg, oral, Daily  atorvastatin, 40 mg, oral, q PM  digoxin, 125 mcg, oral, Daily  gabapentin, 300 mg, oral, TID  insulin glargine, 20 Units, subcutaneous, Nightly  insulin lispro, 0-15 Units, subcutaneous, TID AC  lisinopril, 10 mg, oral, Daily  melatonin, 6 mg, oral, Nightly  metoclopramide, 5 mg, oral, TID  metoprolol tartrate, 100 mg, oral, BID  pantoprazole, 40 mg, oral, BID AC      Continuous medications     PRN medications  PRN medications: acetaminophen, dextrose, dextrose, glucagon, glucagon, guaiFENesin, hydrOXYzine HCL, meclizine, ondansetron ODT, polyethylene glycol, traMADol    Solis Castrejon MD

## 2025-01-08 NOTE — PROGRESS NOTES
Pharmacy Medication History Review    Sulma Kumari is a 64 y.o. female admitted for Viral syndrome. Pharmacy reviewed the patient's lyijq-av-zzjnzxjre medications and allergies for accuracy.    The list below reflects the updated PTA list.   Prior to Admission Medications   Prescriptions Last Dose Informant   amLODIPine (Norvasc) 10 mg tablet 1/6/2025 Morning Self, Other   Sig: Take 1 tablet (10 mg) by mouth once daily.   amitriptyline (Elavil) 10 mg tablet 1/5/2025 Self, Other   Sig: Take 1 tablet (10 mg) by mouth once daily at bedtime.   aspirin 81 mg EC tablet Past Week Self, Other   Sig: Take 1 tablet (81 mg) by mouth 1 time.   atorvastatin (Lipitor) 40 mg tablet  Self, Other   Sig: Take 1 tablet (40 mg) by mouth once daily in the evening.   calcium polycarbophiL (Fibercon) 625 mg tablet  Self, Other   Sig: Take 1 tablet (625 mg) by mouth once daily.   digoxin (Lanoxin) 125 MCG tablet 1/6/2025 Morning Self, Other   Sig: Take 1 tablet (125 mcg) by mouth once daily.   gabapentin (Neurontin) 300 mg capsule 1/6/2025 Morning Self, Other   Sig: Take 1 capsule (300 mg) by mouth 3 times a day.   hydrOXYzine HCL (Atarax) 10 mg tablet  Self, Other   Sig: Take 1 tablet (10 mg) by mouth 3 times a day.   insulin detemir (Levemir U-100 Insulin) 100 unit/mL injection  Self, Other   Sig: Inject 24 Units under the skin once daily. Take as directed per insulin instructions.   insulin lispro 100 unit/mL injection  Self, Other   Sig: Inject under the skin. Take as directed per insulin instructions. Per sliding scale   lisinopril 10 mg tablet 1/6/2025 Morning Self, Other   Sig: Take 1 tablet (10 mg) by mouth once daily.   meclizine (Antivert) 12.5 mg tablet  Self, Other   Sig: Take 1 tablet (12.5 mg) by mouth 3 times a day as needed for nausea.   metoclopramide (Reglan) 5 mg tablet 1/6/2025 Morning Self, Other   Sig: Take 1 tablet (5 mg) by mouth 3 times a day.   metoprolol tartrate (Lopressor) 100 mg tablet 1/6/2025 Morning Self,  Other   Sig: Take 1 tablet (100 mg) by mouth 2 times a day.   pantoprazole (ProtoNix) 40 mg EC tablet 1/6/2025 Morning Self, Other   Sig: Take 1 tablet (40 mg) by mouth 2 times a day.   semaglutide (Ozempic) 0.25 mg or 0.5 mg(2 mg/1.5 mL) pen injector  Self, Other   Sig: Inject 0.5 mg under the skin 1 (one) time per week. On Fridays      Facility-Administered Medications: None        The list below reflects the updated allergy list. Please review each documented allergy for additional clarification and justification.  Allergies  Reviewed by Pau Riggs RN on 1/6/2025        Severity Reactions Comments    Dapagliflozin Not Specified Itching     Metformin Not Specified GI Upset     Naproxen Not Specified Hives, Unknown Makes eye red    Diazepam Low Itching     Insulin Glargine Low Itching Hives    Sulfa (sulfonamide Antibiotics) Low Rash, Unknown             Patient accepts M2B at discharge. Pharmacy has been updated to [facility].    Sources used to complete the med history include:    [facility]  Pharmacy dispense history  Patient interview Good historian  Chart Review  Care Everywhere   Called [facility] pharmacy and obtained a medication list [phone]    Below are additional concerns with the patient's PTA list.  Patient reports that [facility] ( she has been taking daily to help sleep)  Patient takes Ozempic on Fridays.  Patient takes Meclizine prn only.    Medications ADDED:  Fiber-lax  Ozempic  Levemir  Insulin Lispro  Medications CHANGED:  none  Medications REMOVED:   Melatonin     Santosh Barajas PharmD  Transitions of Care Pharmacist  [facility]  Please reach out via Secure Chat for questions, or if no response call [facility] or [facility]

## 2025-01-08 NOTE — CARE PLAN
The patient's goals for the shift include pt. glucose level under 200 prior to discharge    The clinical goals for the shift include pt. will have stool sent off for C.diff    Over the shift, the patient did not make progress toward the following goals. Barriers to progression include pt. Evening glucose level 165. Recommendations to address these barriers include pt. Not having any BM miralax ordered for pt. Still awaiting to send off stool sample for stool pathogen.

## 2025-01-08 NOTE — PROGRESS NOTES
Assessment/Plan       Sulma Kumari is a 64 y.o. female w/ PMHx of Anxiety and depression, Constipation, Diabetes type II, esophagitis, gastritis, H. pylori infection 2018), Hyperlipidemia, and Hypertension presenting for 2 weeks of congestion and cough followed by recurrent n/v and then diarrhea with black stools. Egd with esophagitis. h/h is stable. On ppi bid to go on.  Follow up op with gi. Follow up path.   -C Diff is still pending.   -[place]       -------------------------------      Melena c/f UGIB  Acute blood loss anemia  H/o esophogitis  H/o PUD, h. pylori  - in setting of h/o esophogitis, gastric ulcer, h. Pylori  - in setting of recurrent nausea and vomitting, possible annalise keri in setting of n/v.   - ct angio limited, did not show active bleed however.   - HDS, does feel dizzy. H/h has remained stable, repeat showed probably false low value obtained.  - continue cld for now.   - trend h/h, repeat this afternoon. Active type and screen. Consented for blood. PPI BID IV. Appreciate gi consult recs.   - egd showed grade a esophogitis, biopsies taken.   - ok for daily ppi, will adjust. Monitor h/h overnight and bowel movements.     Dizziness  - check orthostatics    Viral prodrome: URI, gastroenteritis.   - with cough, congestion, n/v and diarrhea. Pt also reports possible suspcious food ingeston prior  - overal improving. Continue symptomatic management  - monitoring stools as above  - send stool pcr, c. Dif for diarrhea. Will dc c. Dif at this time no specimen given. Will continue to monitor for diarrhea if present will collect stool pcr.       Uncontrolled DMII  - A1c 13  - on glrgine 28 units daily and ozempic and ssi at home  - started glargine 20 units daily, ssi  - glc low in setting of being on cld then npo. Will monitor for adjustments as pt advances diet.     Scar on liver  - seen on ct  - pt after anesthesia reporting a bowel movement with some LLQ discmfort, she complained that she  "felt somethng was wrong with her liver. Pt was educated and counseled.  - repeat lft torrow. Prior lft stable. Albumin/inr ok, synthetic fucntion normal. Low concern for liver disease.        HTN  - close monitoring due to concern for bleed  - continue amlidpine, lisinpril, metop     Hypokalemia  - repeat rfp this afternoon. Check magnesium    Afib  - on digoxin and metop       Scheduled outpatient appointments in system:   No future appointments.  ---------------------------------------------------------------------------------------------------  Subjective   NE     ---------------------------------------------------------------------------------------------------  Objective   Last Recorded Vitals  Blood pressure 111/66, pulse 75, temperature 36 °C (96.8 °F), temperature source Temporal, resp. rate 16, height 1.651 m (5' 5\"), weight 78.9 kg (174 lb), SpO2 98%.  Intake/Output last 3 Shifts:  I/O last 3 completed shifts:  In: - (0 mL/kg)   Out: 500 (6.3 mL/kg) [Urine:500 (0.2 mL/kg/hr)]  Weight: 78.9 kg     Physical Exam  Vitals and nursing note reviewed.   Constitutional:       General: She is not in acute distress.     Appearance: Normal appearance. She is not ill-appearing or toxic-appearing.   HENT:      Head: Normocephalic and atraumatic.      Mouth/Throat:      Mouth: Mucous membranes are moist.   Eyes:      General: No scleral icterus.     Extraocular Movements: Extraocular movements intact.      Conjunctiva/sclera: Conjunctivae normal.   Cardiovascular:      Rate and Rhythm: Normal rate and regular rhythm.      Heart sounds: S1 normal and S2 normal. No murmur heard.  Pulmonary:      Effort: Pulmonary effort is normal. No respiratory distress.      Breath sounds: No wheezing, rhonchi or rales.   Abdominal:      General: Bowel sounds are normal. There is no distension.      Palpations: Abdomen is soft.      Tenderness: There is no abdominal tenderness. There is no guarding or rebound.      Comments: Non-tender, " nonacute abdomen, no hepatomegaly noted   Musculoskeletal:         General: No swelling or deformity.      Cervical back: Neck supple.   Skin:     General: Skin is warm and dry.      Findings: No rash.   Neurological:      General: No focal deficit present.      Mental Status: She is alert. Mental status is at baseline.   Psychiatric:         Mood and Affect: Mood normal.         Relevant Results  Lab Results   Component Value Date    WBC 5.9 01/07/2025    HGB 11.5 (L) 01/07/2025    HCT 34.6 (L) 01/07/2025    MCV 90 01/07/2025     01/07/2025      Lab Results   Component Value Date    GLUCOSE 157 (H) 01/07/2025    CALCIUM 9.1 01/07/2025     01/07/2025    K 4.1 01/07/2025    CO2 24 01/07/2025     01/07/2025    BUN 10 01/07/2025    CREATININE 0.58 01/07/2025     Scheduled medications  amitriptyline, 10 mg, oral, Nightly  amLODIPine, 10 mg, oral, Daily  atorvastatin, 40 mg, oral, q PM  digoxin, 125 mcg, oral, Daily  gabapentin, 300 mg, oral, TID  insulin glargine, 20 Units, subcutaneous, Nightly  insulin lispro, 0-15 Units, subcutaneous, TID AC  lisinopril, 10 mg, oral, Daily  melatonin, 6 mg, oral, Nightly  metoclopramide, 5 mg, oral, TID  metoprolol tartrate, 100 mg, oral, BID  pantoprazole, 40 mg, oral, BID AC      Continuous medications     PRN medications  PRN medications: acetaminophen, dextrose, dextrose, glucagon, glucagon, hydrOXYzine HCL, meclizine, ondansetron ODT, traMADol    Solis Castrejon MD

## 2025-01-09 ENCOUNTER — PHARMACY VISIT (OUTPATIENT)
Dept: PHARMACY | Facility: CLINIC | Age: 65
End: 2025-01-09
Payer: COMMERCIAL

## 2025-01-09 VITALS
OXYGEN SATURATION: 99 % | BODY MASS INDEX: 28.99 KG/M2 | SYSTOLIC BLOOD PRESSURE: 129 MMHG | WEIGHT: 174 LBS | RESPIRATION RATE: 20 BRPM | HEIGHT: 65 IN | DIASTOLIC BLOOD PRESSURE: 79 MMHG | HEART RATE: 73 BPM | TEMPERATURE: 97.3 F

## 2025-01-09 LAB
ALBUMIN SERPL BCP-MCNC: 3.1 G/DL (ref 3.4–5)
ANION GAP SERPL CALC-SCNC: 11 MMOL/L (ref 10–20)
BUN SERPL-MCNC: 9 MG/DL (ref 6–23)
CALCIUM SERPL-MCNC: 8.8 MG/DL (ref 8.6–10.6)
CHLORIDE SERPL-SCNC: 107 MMOL/L (ref 98–107)
CO2 SERPL-SCNC: 26 MMOL/L (ref 21–32)
CREAT SERPL-MCNC: 0.61 MG/DL (ref 0.5–1.05)
EGFRCR SERPLBLD CKD-EPI 2021: >90 ML/MIN/1.73M*2
ERYTHROCYTE [DISTWIDTH] IN BLOOD BY AUTOMATED COUNT: 13.2 % (ref 11.5–14.5)
GLUCOSE BLD MANUAL STRIP-MCNC: 124 MG/DL (ref 74–99)
GLUCOSE BLD MANUAL STRIP-MCNC: 157 MG/DL (ref 74–99)
GLUCOSE SERPL-MCNC: 213 MG/DL (ref 74–99)
HCT VFR BLD AUTO: 31.4 % (ref 36–46)
HGB BLD-MCNC: 10.8 G/DL (ref 12–16)
MCH RBC QN AUTO: 30.2 PG (ref 26–34)
MCHC RBC AUTO-ENTMCNC: 34.4 G/DL (ref 32–36)
MCV RBC AUTO: 88 FL (ref 80–100)
NRBC BLD-RTO: 0 /100 WBCS (ref 0–0)
PHOSPHATE SERPL-MCNC: 3.3 MG/DL (ref 2.5–4.9)
PLATELET # BLD AUTO: 360 X10*3/UL (ref 150–450)
POTASSIUM SERPL-SCNC: 3.9 MMOL/L (ref 3.5–5.3)
RBC # BLD AUTO: 3.58 X10*6/UL (ref 4–5.2)
SODIUM SERPL-SCNC: 140 MMOL/L (ref 136–145)
WBC # BLD AUTO: 5.5 X10*3/UL (ref 4.4–11.3)

## 2025-01-09 PROCEDURE — 85027 COMPLETE CBC AUTOMATED: CPT | Performed by: STUDENT IN AN ORGANIZED HEALTH CARE EDUCATION/TRAINING PROGRAM

## 2025-01-09 PROCEDURE — RXMED WILLOW AMBULATORY MEDICATION CHARGE

## 2025-01-09 PROCEDURE — 80069 RENAL FUNCTION PANEL: CPT | Performed by: STUDENT IN AN ORGANIZED HEALTH CARE EDUCATION/TRAINING PROGRAM

## 2025-01-09 PROCEDURE — 2500000001 HC RX 250 WO HCPCS SELF ADMINISTERED DRUGS (ALT 637 FOR MEDICARE OP)

## 2025-01-09 PROCEDURE — 2500000002 HC RX 250 W HCPCS SELF ADMINISTERED DRUGS (ALT 637 FOR MEDICARE OP, ALT 636 FOR OP/ED)

## 2025-01-09 PROCEDURE — 87506 IADNA-DNA/RNA PROBE TQ 6-11: CPT | Performed by: STUDENT IN AN ORGANIZED HEALTH CARE EDUCATION/TRAINING PROGRAM

## 2025-01-09 PROCEDURE — 99239 HOSP IP/OBS DSCHRG MGMT >30: CPT | Performed by: STUDENT IN AN ORGANIZED HEALTH CARE EDUCATION/TRAINING PROGRAM

## 2025-01-09 PROCEDURE — 82947 ASSAY GLUCOSE BLOOD QUANT: CPT

## 2025-01-09 PROCEDURE — 2500000004 HC RX 250 GENERAL PHARMACY W/ HCPCS (ALT 636 FOR OP/ED): Performed by: STUDENT IN AN ORGANIZED HEALTH CARE EDUCATION/TRAINING PROGRAM

## 2025-01-09 PROCEDURE — 36415 COLL VENOUS BLD VENIPUNCTURE: CPT | Performed by: STUDENT IN AN ORGANIZED HEALTH CARE EDUCATION/TRAINING PROGRAM

## 2025-01-09 PROCEDURE — 2500000001 HC RX 250 WO HCPCS SELF ADMINISTERED DRUGS (ALT 637 FOR MEDICARE OP): Performed by: STUDENT IN AN ORGANIZED HEALTH CARE EDUCATION/TRAINING PROGRAM

## 2025-01-09 RX ORDER — ASPIRIN 81 MG/1
81 TABLET ORAL ONCE
Qty: 1 TABLET | Refills: 0 | Status: SHIPPED | OUTPATIENT
Start: 2025-01-09 | End: 2025-01-09

## 2025-01-09 RX ORDER — GABAPENTIN 300 MG/1
300 CAPSULE ORAL 3 TIMES DAILY
Qty: 90 CAPSULE | Refills: 0 | Status: SHIPPED | OUTPATIENT
Start: 2025-01-09 | End: 2025-02-08

## 2025-01-09 RX ORDER — ACETAMINOPHEN 325 MG/1
650 TABLET ORAL 3 TIMES DAILY PRN
Qty: 30 TABLET | Refills: 0 | Status: SHIPPED | OUTPATIENT
Start: 2025-01-09

## 2025-01-09 RX ORDER — ATORVASTATIN CALCIUM 40 MG/1
40 TABLET, FILM COATED ORAL EVERY EVENING
Qty: 30 TABLET | Refills: 0 | Status: SHIPPED | OUTPATIENT
Start: 2025-01-09 | End: 2025-02-08

## 2025-01-09 RX ORDER — METOPROLOL TARTRATE 100 MG/1
100 TABLET ORAL 2 TIMES DAILY
Qty: 60 TABLET | Refills: 0 | Status: SHIPPED | OUTPATIENT
Start: 2025-01-09 | End: 2025-02-08

## 2025-01-09 RX ORDER — PANTOPRAZOLE SODIUM 40 MG/1
40 TABLET, DELAYED RELEASE ORAL 2 TIMES DAILY
Qty: 60 TABLET | Refills: 0 | Status: SHIPPED | OUTPATIENT
Start: 2025-01-09 | End: 2025-02-08

## 2025-01-09 RX ORDER — ONDANSETRON 4 MG/1
4 TABLET, ORALLY DISINTEGRATING ORAL EVERY 8 HOURS PRN
Qty: 20 TABLET | Refills: 0 | Status: SHIPPED | OUTPATIENT
Start: 2025-01-09

## 2025-01-09 RX ORDER — AMLODIPINE BESYLATE 10 MG/1
10 TABLET ORAL DAILY
Qty: 30 TABLET | Refills: 0 | Status: SHIPPED | OUTPATIENT
Start: 2025-01-09 | End: 2025-02-08

## 2025-01-09 RX ORDER — GUAIFENESIN 100 MG/5ML
200 SOLUTION ORAL EVERY 4 HOURS PRN
Qty: 120 ML | Refills: 0 | Status: SHIPPED | OUTPATIENT
Start: 2025-01-09

## 2025-01-09 RX ORDER — TALC
6 POWDER (GRAM) TOPICAL NIGHTLY PRN
Qty: 60 TABLET | Refills: 0 | Status: SHIPPED | OUTPATIENT
Start: 2025-01-09 | End: 2025-02-08

## 2025-01-09 RX ORDER — DIGOXIN 125 MCG
125 TABLET ORAL DAILY
Qty: 30 TABLET | Refills: 0 | Status: SHIPPED | OUTPATIENT
Start: 2025-01-09 | End: 2025-02-08

## 2025-01-09 RX ORDER — HYDROXYZINE HYDROCHLORIDE 10 MG/1
10 TABLET, FILM COATED ORAL EVERY 8 HOURS PRN
Qty: 30 TABLET | Refills: 0 | Status: SHIPPED | OUTPATIENT
Start: 2025-01-09

## 2025-01-09 RX ORDER — LISINOPRIL 10 MG/1
10 TABLET ORAL DAILY
Qty: 30 TABLET | Refills: 0 | Status: SHIPPED | OUTPATIENT
Start: 2025-01-09 | End: 2025-02-08

## 2025-01-09 RX ORDER — METOCLOPRAMIDE 5 MG/1
5 TABLET ORAL 3 TIMES DAILY
Qty: 90 TABLET | Refills: 0 | Status: SHIPPED | OUTPATIENT
Start: 2025-01-09 | End: 2025-02-08

## 2025-01-09 RX ADMIN — PANTOPRAZOLE SODIUM 40 MG: 40 TABLET, DELAYED RELEASE ORAL at 06:36

## 2025-01-09 RX ADMIN — GABAPENTIN 300 MG: 300 CAPSULE ORAL at 08:58

## 2025-01-09 RX ADMIN — DIGOXIN 125 MCG: 125 TABLET ORAL at 08:58

## 2025-01-09 RX ADMIN — PANTOPRAZOLE SODIUM 40 MG: 40 TABLET, DELAYED RELEASE ORAL at 15:22

## 2025-01-09 RX ADMIN — METOCLOPRAMIDE 5 MG: 10 TABLET ORAL at 15:22

## 2025-01-09 RX ADMIN — GABAPENTIN 300 MG: 300 CAPSULE ORAL at 15:22

## 2025-01-09 RX ADMIN — METOPROLOL TARTRATE 100 MG: 50 TABLET, FILM COATED ORAL at 08:58

## 2025-01-09 RX ADMIN — POLYETHYLENE GLYCOL 3350 17 G: 17 POWDER, FOR SOLUTION ORAL at 08:58

## 2025-01-09 RX ADMIN — AMLODIPINE BESYLATE 10 MG: 10 TABLET ORAL at 08:58

## 2025-01-09 RX ADMIN — LISINOPRIL 10 MG: 10 TABLET ORAL at 08:58

## 2025-01-09 RX ADMIN — INSULIN LISPRO 3 UNITS: 100 INJECTION, SOLUTION INTRAVENOUS; SUBCUTANEOUS at 08:49

## 2025-01-09 RX ADMIN — METOCLOPRAMIDE 5 MG: 10 TABLET ORAL at 08:58

## 2025-01-09 ASSESSMENT — COGNITIVE AND FUNCTIONAL STATUS - GENERAL
MOBILITY SCORE: 24
DAILY ACTIVITIY SCORE: 24

## 2025-01-09 ASSESSMENT — PAIN - FUNCTIONAL ASSESSMENT: PAIN_FUNCTIONAL_ASSESSMENT: 0-10

## 2025-01-09 ASSESSMENT — PAIN SCALES - GENERAL: PAINLEVEL_OUTOF10: 0 - NO PAIN

## 2025-01-09 NOTE — NURSING NOTE
Pt. Discharged to home , heplock removed, tip intact. Pt. Discharge summary removed with pt. Meds to Bed delivered pt. Medications to pt room. Pt. Awaiting pt. Transport to take down to [facility] where pt. daughter will pick her up.

## 2025-01-09 NOTE — CARE PLAN
The patient's goals for the shift include pt. will be able to be discharged to home.    The clinical goals for the shift include pt. will have no blood in her stools    Over the shift, the patient did not make progress toward the following goals. Barriers to progression include pt. Able to be discharge to home. Recommendations to address these barriers include pt. Had BM and no blood was seen in stool.

## 2025-01-09 NOTE — DISCHARGE SUMMARY
Discharge Diagnosis  Viral syndrome    Issues Requiring Follow-Up  GI and PCP    Discharge Meds     Medication List      START taking these medications     acetaminophen 325 mg tablet; Commonly known as: Tylenol; Take 2 tablets   (650 mg) by mouth 3 times a day as needed for mild pain (1 - 3), headaches   or fever (temp greater than 38.0 C).   guaiFENesin 100 mg/5 mL syrup; Commonly known as: Robitussin; Take 10 mL   (200 mg) by mouth every 4 hours if needed for congestion or cough.   ondansetron ODT 4 mg disintegrating tablet; Commonly known as:   Zofran-ODT; Dissolve 1 tablet (4 mg) in the mouth every 8 hours if needed   for nausea or vomiting.     CHANGE how you take these medications     hydrOXYzine HCL 10 mg tablet; Commonly known as: Atarax; Take 1 tablet   (10 mg) by mouth every 8 hours if needed for itching, allergies or   anxiety.; What changed: when to take this, reasons to take this   melatonin 3 mg tablet; Take 2 tablets (6 mg) by mouth as needed at   bedtime for sleep.; What changed: when to take this, reasons to take this     CONTINUE taking these medications     amitriptyline 10 mg tablet; Commonly known as: Elavil   amLODIPine 10 mg tablet; Commonly known as: Norvasc; Take 1 tablet (10   mg) by mouth once daily.   aspirin 81 mg EC tablet; Take 1 tablet (81 mg) by mouth 1 time for 1   dose.   atorvastatin 40 mg tablet; Commonly known as: Lipitor; Take 1 tablet (40   mg) by mouth once daily in the evening.   calcium polycarbophiL 625 mg tablet; Commonly known as: Fibercon   digoxin 125 MCG tablet; Commonly known as: Lanoxin; Take 1 tablet (125   mcg) by mouth once daily.   gabapentin 300 mg capsule; Commonly known as: Neurontin; Take 1 capsule   (300 mg) by mouth 3 times a day.   insulin lispro 100 unit/mL injection   Levemir U-100 Insulin 100 unit/mL injection; Generic drug: insulin   detemir   lisinopril 10 mg tablet; Take 1 tablet (10 mg) by mouth once daily.   metoclopramide 5 mg tablet; Commonly  known as: Reglan; Take 1 tablet (5   mg) by mouth 3 times a day.   metoprolol tartrate 100 mg tablet; Commonly known as: Lopressor; Take 1   tablet (100 mg) by mouth 2 times a day.   Ozempic 0.25 mg or 0.5 mg(2 mg/1.5 mL) pen injector; Generic drug:   semaglutide   pantoprazole 40 mg EC tablet; Commonly known as: ProtoNix; Take 1 tablet   (40 mg) by mouth 2 times a day.     STOP taking these medications     meclizine 12.5 mg tablet; Commonly known as: Antivert       Test Results Pending At Discharge  Pending Labs       Order Current Status    Stool Pathogen Panel, PCR In process            Hospital Course  Sulma Kumari is a 64 y.o. female w/ PMHx of Anxiety and depression, Constipation, Diabetes type II, esophagitis, gastritis, H. pylori infection 2018), Hyperlipidemia, and Hypertension presenting for 2 weeks of congestion and cough followed by recurrent n/v and then diarrhea with black stools. Egd with esophagitis. h/h is stable. On ppi bid to go on.  Follow up op with gi. Follow up path. C diff is less likely giving normal bowel movement over the past two days.  She wasnt having profuse diarrhea when saw her so not concerned. said she had diarrhea 2ish wks ago but the frequency of her stools were improving. GI follow up in fellows clinic after discharge. Requested m2b       40 min     Pertinent Physical Exam At Time of Discharge  Physical Exam    Outpatient Follow-Up  No future appointments.      Solis Castrejon MD

## 2025-01-09 NOTE — PROGRESS NOTES
DISCHARGE MEDICATION REVIEW BY PHARMACY     NAME:  Sulma Kumari   MRN:  [id number]   SERVICE DATE: 1/9/2025   SERVICE TIME:  9:44 AM       The following discharge medications were reviewed by a pharmacist.  The following recommendations were made: N/A  Dispo: home       Medication List      START taking these medications     acetaminophen 325 mg tablet; Commonly known as: Tylenol; Take 2 tablets   (650 mg) by mouth 3 times a day as needed for mild pain (1 - 3), headaches   or fever (temp greater than 38.0 C).   guaiFENesin 100 mg/5 mL syrup; Commonly known as: Robitussin; Take 10 mL   (200 mg) by mouth every 4 hours if needed for congestion or cough.   ondansetron ODT 4 mg disintegrating tablet; Commonly known as:   Zofran-ODT; Dissolve 1 tablet (4 mg) in the mouth every 8 hours if needed   for nausea or vomiting.     CHANGE how you take these medications     hydrOXYzine HCL 10 mg tablet; Commonly known as: Atarax; Take 1 tablet   (10 mg) by mouth every 8 hours if needed for itching, allergies or   anxiety.; What changed: when to take this, reasons to take this   melatonin 3 mg tablet; Take 2 tablets (6 mg) by mouth as needed at   bedtime for sleep.; What changed: when to take this, reasons to take this     CONTINUE taking these medications     amitriptyline 10 mg tablet; Commonly known as: Elavil   amLODIPine 10 mg tablet; Commonly known as: Norvasc; Take 1 tablet (10   mg) by mouth once daily.   aspirin 81 mg EC tablet; Take 1 tablet (81 mg) by mouth 1 time for 1   dose.   atorvastatin 40 mg tablet; Commonly known as: Lipitor; Take 1 tablet (40   mg) by mouth once daily in the evening.   calcium polycarbophiL 625 mg tablet; Commonly known as: Fibercon   digoxin 125 MCG tablet; Commonly known as: Lanoxin; Take 1 tablet (125   mcg) by mouth once daily.   gabapentin 300 mg capsule; Commonly known as: Neurontin; Take 1 capsule   (300 mg) by mouth 3 times a day.   insulin lispro 100 unit/mL injection   Levemir U-100  Insulin 100 unit/mL injection; Generic drug: insulin   detemir   lisinopril 10 mg tablet; Take 1 tablet (10 mg) by mouth once daily.   metoclopramide 5 mg tablet; Commonly known as: Reglan; Take 1 tablet (5   mg) by mouth 3 times a day.   metoprolol tartrate 100 mg tablet; Commonly known as: Lopressor; Take 1   tablet (100 mg) by mouth 2 times a day.   Ozempic 0.25 mg or 0.5 mg(2 mg/1.5 mL) pen injector; Generic drug:   semaglutide   pantoprazole 40 mg EC tablet; Commonly known as: ProtoNix; Take 1 tablet   (40 mg) by mouth 2 times a day.     STOP taking these medications     meclizine 12.5 mg tablet; Commonly known as: Antivmarci Rosales, PharmD, [facility]  Dior Cordova 3 Pharmacist

## 2025-01-10 LAB
C COLI+JEJ+UPSA DNA STL QL NAA+PROBE: NOT DETECTED
EC STX1 GENE STL QL NAA+PROBE: NOT DETECTED
EC STX2 GENE STL QL NAA+PROBE: NOT DETECTED
NOROVIRUS GI + GII RNA STL NAA+PROBE: DETECTED
RV RNA STL NAA+PROBE: NOT DETECTED
SALMONELLA DNA STL QL NAA+PROBE: NOT DETECTED
SHIGELLA DNA SPEC QL NAA+PROBE: NOT DETECTED
V CHOLERAE DNA STL QL NAA+PROBE: NOT DETECTED
Y ENTEROCOL DNA STL QL NAA+PROBE: NOT DETECTED

## 2025-01-16 NOTE — DOCUMENTATION CLARIFICATION NOTE
"    PATIENT:               KAY SAM  ACCT #:                  [id number]  MRN:                       [id number]  :                       1960  ADMIT DATE:       2025 4:23 PM  DISCH DATE:        2025 4:16 PM  RESPONDING PROVIDER #:        [id number]          PROVIDER RESPONSE TEXT:    I agree with dietician diagnosis of Moderate Malnutrition on 2025.    CDI QUERY TEXT:    Clarification    Instruction:    Based on your assessment of the patient and the clinical information, please provide the requested documentation by clicking on the appropriate radio button and enter any additional information if prompted.    Question: Please further clarify this patient nutritional status as    When answering this query, please exercise your independent professional judgment. The fact that a question is being asked, does not imply that any particular answer is desired or expected.    The patient's clinical indicators include:  Clinical Information:  PN indicates 64 YOF w/PMH s/f anxiety/depression, constipation, DM2, esophagitis, gastritis, H. pylori infection in 2018; presented to ED for congestion and cough followed by recurrent nausea and vomiting then diarrhea.   EGD indicated esophagitis.    Clinical Indicators:  Dietician consult: \"Moderate malnutrition related to acute disease or injury  As Evidenced by: meeting < 75% of EER for > 1 week, 6% wt loss and 2 weeks and mild muscle wasting and fat loss per physical\"    Treatment: Dietician consult. Recommendation for diet, MVI, and snacks.    Risk Factors: Hx DM2. Esophagitis. Cough followed by recurrent N/V. Diarrhea.  Options provided:  -- I agree with dietician diagnosis of Moderate Malnutrition on 2025.  -- Other - I will add my own diagnosis  -- Refer to Clinical Documentation Reviewer    Query created by: Diya Sharif on 2025 5:53 PM      Electronically signed by:  RNAULFO HOLLIS MD 2025 2:30 PM          "

## 2025-01-16 NOTE — DOCUMENTATION CLARIFICATION NOTE
"    PATIENT:               KAY SAM  ACCT #:                  [id number]  MRN:                       [id number]  :                       1960  ADMIT DATE:       2025 4:23 PM  DISCH DATE:        2025 4:16 PM  RESPONDING PROVIDER #:        [id number]          PROVIDER RESPONSE TEXT:    H. pylori related to acute gastritis without bleeding    CDI QUERY TEXT:    Clarification    Instruction:    Based on your assessment of the patient and the clinical information, please provide the requested documentation by clicking on the appropriate radio button and enter any additional information if prompted.    Question: Please clarify if a relationship exists between H. pylori and gastritis    When answering this query, please exercise your independent professional judgment. The fact that a question is being asked, does not imply that any particular answer is desired or expected.    The patient's clinical indicators include:  Clinical Information:  PN indicates 64 YOF w/PMH s/f esophagitis, gastritis, H. pylori infection in 2018    Clinical Indicators:  ED note \"presenting for melena\"     GE consult indicates presents for \"2 day history of black stools\",  \"had couple of bouts of witnessed black stool\"  \"Reports having episodic reflux symptoms for which she takes PPI BID regularly\"     EGD Impression: \"Grade A esophagitis in the lower third of the esophagus  Mild erythematous, mosaic mucosa in the body of the stomach and antrum; performed cold forceps biopsy to rule out H. pylori\"     surgical pathology \"A. STOMACH, BODY/CORPUS, BIOPSY:  --  HELICOBACTER PYLORI ASSOCIATED CHRONIC ACTIVE GASTRITIS\"     DCS \"Egd with esophagitis\"    Treatment: GE consult. EGD with bx on . PPI BID. Follow up w/GI outpatient.    Risk Factors: Hx esophagitis, gastritis, and 2018 H. pylori infection. Black stools. Hx episodic reflux symptoms. Recurrent N/V.  Options provided:  -- H. pylori related to acute gastritis " with bleeding  -- H. pylori related to acute gastritis without bleeding  -- H. pylori related to chronic gastritis with bleeding  -- H. pylori related to chronic gastritis without bleeding  -- H. pylori unrelated to acute gastritis with bleeding  -- H. pylori unrelated to acute gastritis without bleeding  -- H. pylori unrelated to chronic gastritis with bleeding  -- H. pylori unrelated to chronic gastritis without bleeding  -- Other - I will add my own diagnosis  -- Refer to Clinical Documentation Reviewer    Query created by: Diya Sharif on 1/14/2025 5:53 PM      Electronically signed by:  RANULFO HOLLIS MD 1/16/2025 2:30 PM

## 2025-01-17 LAB
ELECTRONICALLY SIGNED BY: NORMAL
H. PYLORI DRUG SUSCEPTIBILITY RESULTS: NORMAL

## 2025-01-19 DIAGNOSIS — A04.8 BACTERIAL INFECTION DUE TO H. PYLORI: Primary | ICD-10-CM

## 2025-01-19 RX ORDER — METRONIDAZOLE 500 MG/1
500 TABLET ORAL 2 TIMES DAILY
Qty: 28 TABLET | Refills: 0 | Status: SHIPPED | OUTPATIENT
Start: 2025-01-19 | End: 2025-02-02

## 2025-01-19 RX ORDER — TETRACYCLINE HYDROCHLORIDE 500 MG/1
500 CAPSULE ORAL 4 TIMES DAILY
Qty: 56 CAPSULE | Refills: 0 | Status: SHIPPED | OUTPATIENT
Start: 2025-01-19 | End: 2025-02-02

## 2025-01-19 RX ORDER — PANTOPRAZOLE SODIUM 40 MG/1
40 TABLET, DELAYED RELEASE ORAL 2 TIMES DAILY
Qty: 28 TABLET | Refills: 0 | Status: SHIPPED | OUTPATIENT
Start: 2025-01-19 | End: 2025-02-02

## 2025-01-19 NOTE — PROGRESS NOTES
Tried calling patient at listed number without answer and unable to leave voicemail. Called patient's NOK listed in chart Lora Kumari (Daughter) at [phone]. Daughter states that her mother's phone has not been working. Lora has been deemed NOK during pt's prior hospitalizations and daughter sttes that she help her mother with her medications. Writer let her know about positive hylori results for EGD 1/6/25.     Prescribed Bismuth quadruple therapy = Bismuth 524mg QID + Tetracycline 500 mg QID + Metronidazole 500 mg QID + PPI BID x 14 days to [facility] [place] [address] [place] [address]. Daughter says she will  the prescriptions. Stated the importance of following up with GI as she will need testing for eradication. She has an appointment with GI at [facility] on 3/11/25. Daughter voiced understanding.

## 2025-03-10 NOTE — PROGRESS NOTES
"Subjective   Patient ID: Sulma Kumari is a 64 y.o. female who presents for melena.     HPI  This is a 63 year old female with a PMH of anxiety, depression, constipation, diabetes type II, esophagitis, gastritis, recurrent H. pylori infection (2018), Hyperlipidemia, and Hypertension who is coming for evaluation of melena. On interview, patient states that prior to her hospitalization; she was having URI symptoms, coffee ground emesis, and melena for 3 weeks. She feels much better since the admission and has been taking the PPI since discharge. Currently, patient has been having 3-4 BM a day and she describes it as solid and dark brown. She does endorse occasional nausea but does not report any further episodes of emesis. Endorses occasional LUQ and epigastric abdominal pain described as burning pain. She does feel \"gassy\". States that she does not recall previous history of H.pylori.     Interval history:  Patient was recently hospitalized 1/4/2025 - 1/9/2025 for 2 days of vomiting and black watery stools. No bright red per blood per rectum, pt reported LLQ abdominal pain, non radiating, nothing make the pain better or worse. Patient denied anticoagulation use and denied recent NSAIDs use. Initial work up notable for Hb of 13.0. Repeat labs on 01/05 after patient got multiple fluid boluses demonstrated Hb of 9.2. During her ED stay, she had couple of bouts of witnessed black stool as per primary. While admitted, patient underwent CTA abdomen which was a limited evaluation for GI bleed with recommendation to undergo a tagged RBC scan. Given history of upper GI bleeding as well as H. Pylori infection, GI was consulted for evaluation of possible ongoing upper GI bleeding. GI with plan for EGD with biopsy. EGD was significant for Grade A esophagitis in the lower third of the esophagus. Biopsy collected to rule out H.pylori. Later in the course of the patient's admission, diarrhea had resolved and the frequency of her " stools were improving since admission. Additionally, patient's Hgb increased from 9>12 (around her baseline) without any transfusions. Throughout the hospital stay, patient's H/H were stable. She was discharged home with a PPI BID and instructions to follow up with GI outpatient.      Notably, patient has a history of H.pylori infection without eradication testing.     Per patient, has had a colonoscopy last year at an OSH and reports that the results were normal. States that she will call to have records faxed over to .      Previous endoscopies:  GI history   EGD 1/6/2025  Impression  The upper third of the esophagus and middle third of the esophagus appeared normal.  Grade A esophagitis in the lower third of the esophagus  Mild erythematous, mosaic mucosa in the body of the stomach and antrum; performed cold forceps biopsy to rule out H. pylori  The duodenal bulb, 1st part of the duodenum and 2nd part of the duodenum appeared normal.  Mild abnormal mucosa in the esophagus  Polyp in the lesser curve of the stomach    11/17/20 EGD hematemesis  - Normal oropharynx.  - Normal esophagus.  - Normal stomach.  - Normal examined duodenum.  - No specimens collected.    10/8/18 EGD  - LA Grade C reflux esophagitis.  - Non-bleeding gastric ulcers with no stigmata of bleeding. Biopsied for recurrent H. Pylori.  - Retained fluid in the 2nd portion of the duodenum  Path=Helicobacter pylori-induced chronic active gastritis with reactive epithelial changes    8/3/18 EGD  - Non-severe reflux esophagitis. Rule out Moore's  esophagus. Biopsied.  - Gastritis. Biopsied.  - Normal examined duodenum.  - The examination was otherwise normal.  Path-1. Antrum, biopsy (A) - Antral mucosa with chronic inactive gastritis and  rare Helicobacter organisms. - See comment. 2. Esophagogastric junction,  biopsy (B) - Inflamed cardiofundic mucosa. - No evidence of intestinal  metaplasia or dysplasia.    1/19/16 EGD  - Normal esophagus.  -  "Erosive gastropathy.  - Normal examined duodenum.  - No specimens collected.     Labs:  H. Pylori detected on EGD  2025: Hgb 10.8 Platelets 379  Norovirus positive     Imaging:  CTA Abdomen 2025  IMPRESSION:  1. No evidence of acute abdominal or pelvic abnormality.  2. Dense contrast throughout the colon and scattered throughout the  small bowel would limit evaluation for active GI bleed on CTA.  Therefore, if clinical concern for active GI bleed persists, nuclear  medicine tagged RBC scan may be considered.  3. Other chronic findings as above.     Past Medical History: anxiety, depression, constipation, diabetes type II, esophagitis, gastritis, recurrent H. pylori infection (2018), Hyperlipidemia, and Hypertension     Past Surgical History: L ankle surgery , back surgery, cholecystectomy      Social history: No current alcohol. Smokes marijuana occasionally. Smoked a 1/2 pack a day for 50 years and quit 6 years ago. Currently living in an apartment. Retired, was an [employer]. Not currently sexually active.     Family Medical History: Sister  of breast cancer, Mother  of \"stomach issues\"    Review of Systems  Patient denies dysphagia, odynophagia, melena, BRBPR, Nausea/vomiting, fevers/chills, weight loss, 12 point ROS done and neg unless otherwise stated.     Objective   Vitals:  /73 (BP Location: Left arm, Patient Position: Sitting, BP Cuff Size: Adult)   Pulse 90   Temp 35.8 °C (96.5 °F) (Temporal)   Resp 18   Wt 78.5 kg (173 lb)   SpO2 100%   BMI 28.79 kg/m²      Physical Exam  Constitutional: Well-developed female in no acute distress.  HEENT: Normocephalic, atraumatic. PERRL. EOMI. No cervical lymphadenopathy.  Respiratory: CTA bilaterally. No wheezes, rales, or rhonchi. Normal respiratory effort.  Cardiovascular: RRR. No murmurs, gallops, or rubs. No JVD. Radial pulses 2+.  Abdominal: Soft, nondistended, tenderness to palpation in all quadrants. Bowel sounds present. No " hepatosplenomegaly or masses. No CVA tenderness.  Neuro: CN II-XII intact. UE and LE strength 5/5 bilaterally and sensation intact. Normal FTN testing.  MSK: No LE edema bilaterally.  Skin: Warm, dry. No rashes or wounds.  Psych: Appropriate mood and affect.     Assessment/Plan   63 year old female with a PMH of Anxiety and depression, Constipation, Diabetes type II, esophagitis, gastritis, H. pylori infection (2018), Hyperlipidemia, and Hypertension who is coming for evaluation of melena.     Patient underwent EGD which was significant for Grade A esophagitis. Surgical pathology specimen collected which was positive for H. Pylori. Patient has a history of positive H. Pylori for which she has not been treated since EGD in Jan 2025. No further overt GIB.    Ddx: PUD 2/2 H.pylori which can then increased risk of UGIB and cause melena, esophagitis from longstanding GERD     Plan:  -order rifabutin triple therapy (antibiotic regimen) for eradication of H.pylori gastritis for 14 days.  -we will order a urea breath test 4 weeks after completion of antibiotics to check for eradication of H.pylori  -we will obtain lab and iron studies today  -Avoid NSAIDs: ibuprofen, aleeve, naproxen, ibuprofen  -Please call  in May 2025 to schedule urea breath test to confirm eradication of H.pylori infection  -per pt, she had a colonoscopy at OSH last year which was normal, request PCP office to obtain report so it may be reveiwed    We will see you back in 4 months    Sara Silvestre MD 03/11/25 10:34 AM

## 2025-03-11 ENCOUNTER — OFFICE VISIT (OUTPATIENT)
Dept: GASTROENTEROLOGY | Facility: HOSPITAL | Age: 65
End: 2025-03-11
Payer: MEDICAID

## 2025-03-11 VITALS
RESPIRATION RATE: 18 BRPM | BODY MASS INDEX: 28.79 KG/M2 | SYSTOLIC BLOOD PRESSURE: 121 MMHG | TEMPERATURE: 96.5 F | DIASTOLIC BLOOD PRESSURE: 73 MMHG | WEIGHT: 173 LBS | HEART RATE: 90 BPM | OXYGEN SATURATION: 100 %

## 2025-03-11 DIAGNOSIS — B96.81 HELICOBACTER POSITIVE GASTRITIS: Primary | ICD-10-CM

## 2025-03-11 DIAGNOSIS — K29.70 HELICOBACTER POSITIVE GASTRITIS: Primary | ICD-10-CM

## 2025-03-11 DIAGNOSIS — K92.1 MELENA: ICD-10-CM

## 2025-03-11 PROCEDURE — 99215 OFFICE O/P EST HI 40 MIN: CPT | Performed by: STUDENT IN AN ORGANIZED HEALTH CARE EDUCATION/TRAINING PROGRAM

## 2025-03-11 PROCEDURE — 1159F MED LIST DOCD IN RCRD: CPT | Performed by: STUDENT IN AN ORGANIZED HEALTH CARE EDUCATION/TRAINING PROGRAM

## 2025-03-11 PROCEDURE — 1125F AMNT PAIN NOTED PAIN PRSNT: CPT | Performed by: STUDENT IN AN ORGANIZED HEALTH CARE EDUCATION/TRAINING PROGRAM

## 2025-03-11 RX ORDER — OMEPRAZOLE 40 MG/1
40 CAPSULE, DELAYED RELEASE ORAL DAILY
Qty: 14 CAPSULE | Refills: 0 | Status: SHIPPED | OUTPATIENT
Start: 2025-03-11 | End: 2025-03-25

## 2025-03-11 RX ORDER — AMOXICILLIN 500 MG/1
1000 CAPSULE ORAL 2 TIMES DAILY
Qty: 56 CAPSULE | Refills: 0 | Status: SHIPPED | OUTPATIENT
Start: 2025-03-11 | End: 2025-03-25

## 2025-03-11 RX ORDER — RIFABUTIN 150 MG/1
150 CAPSULE ORAL DAILY
Qty: 14 CAPSULE | Refills: 0 | Status: SHIPPED | OUTPATIENT
Start: 2025-03-11 | End: 2025-03-25

## 2025-03-11 ASSESSMENT — PAIN SCALES - GENERAL: PAINLEVEL_OUTOF10: 7

## 2025-03-11 NOTE — PATIENT INSTRUCTIONS
Thank you for coming to GI clinic, we will:    -order rifabutin triple therapy (antibiotic regimen) for eradication of H.pylori gastritis for 14 days.  -we will order a urea breath test 4 weeks after completion of antibiotics to check for eradication of H.pylori  -we will obtain lab and iron studies today  -Avoid NSAIDs: ibuprofen, aleeve, naproxen, ibuprofen  -Please call  in May 2025 to schedule urea breath test to confirm eradication of H.pylori infection    We will see you back in 4 months

## 2025-03-12 LAB
ERYTHROCYTE [DISTWIDTH] IN BLOOD BY AUTOMATED COUNT: 13.9 % (ref 11–15)
HCT VFR BLD AUTO: 38.1 % (ref 35–45)
HGB BLD-MCNC: 12.5 G/DL (ref 11.7–15.5)
IRON SATN MFR SERPL: 20 % (CALC) (ref 16–45)
IRON SERPL-MCNC: 62 MCG/DL (ref 45–160)
MCH RBC QN AUTO: 30.7 PG (ref 27–33)
MCHC RBC AUTO-ENTMCNC: 32.8 G/DL (ref 32–36)
MCV RBC AUTO: 93.6 FL (ref 80–100)
PLATELET # BLD AUTO: 311 THOUSAND/UL (ref 140–400)
PMV BLD REES-ECKER: 11.5 FL (ref 7.5–12.5)
RBC # BLD AUTO: 4.07 MILLION/UL (ref 3.8–5.1)
TIBC SERPL-MCNC: 309 MCG/DL (CALC) (ref 250–450)
WBC # BLD AUTO: 7.4 THOUSAND/UL (ref 3.8–10.8)